# Patient Record
Sex: MALE | URBAN - METROPOLITAN AREA
[De-identification: names, ages, dates, MRNs, and addresses within clinical notes are randomized per-mention and may not be internally consistent; named-entity substitution may affect disease eponyms.]

---

## 2018-07-06 ENCOUNTER — OFFICE VISIT (OUTPATIENT)
Dept: URBAN - METROPOLITAN AREA CLINIC 76 | Facility: CLINIC | Age: 83
End: 2018-07-06
Payer: COMMERCIAL

## 2018-07-06 DIAGNOSIS — H35.3122 NEXDTVE AGE-RELATED MCLR DEGN, LEFT EYE, INTERMED DRY STAGE: Primary | ICD-10-CM

## 2018-07-06 PROCEDURE — 92134 CPTRZ OPH DX IMG PST SGM RTA: CPT | Performed by: OPHTHALMOLOGY

## 2018-07-06 PROCEDURE — 99213 OFFICE O/P EST LOW 20 MIN: CPT | Performed by: OPHTHALMOLOGY

## 2018-07-06 ASSESSMENT — INTRAOCULAR PRESSURE
OD: 16
OS: 12

## 2018-07-06 NOTE — IMPRESSION/PLAN
Impression: Nexdtve age-related mclr degn, left eye, intermed dry stage: H35.3122. Plan: OCT ordered and performed today. Discussed diagnosis with patient, OCT demonstrates the lack of SRF or CME. Additional treatment is not recommended at this time but we will continue to monitor frequently. The patient was advised to continue AREDS multi-vitamins, monitor the amsler grid closely, monitor vision one eye at a time. Call immediately with any vision changes. Patient agrees with plan.

## 2018-07-06 NOTE — IMPRESSION/PLAN
Impression: Exdtve age-rel mclr degn, right eye, with inact chrdl neovas: H35.5705. Plan: OCT ordered and performed today. Discussed diagnosis with patient, OCT demonstrates Drusen w clumping in the right eye. Discussed with Patient there is no bleeding today, recommend patient to closely monitor vision. Patient will travel in Nov. so will see patient prior. Discussed the bleeding has resolved. Additional treatment is not recommended at this time but we will continue to monitor frequently. The patient was advised to continue AREDS multi-vitamins, monitor the amsler grid closely, monitor vision one eye at a time. Call immediately with any vision changes. Patient agrees with plan.

## 2018-09-28 ENCOUNTER — OFFICE VISIT (OUTPATIENT)
Dept: URBAN - METROPOLITAN AREA CLINIC 76 | Facility: CLINIC | Age: 83
End: 2018-09-28
Payer: COMMERCIAL

## 2018-09-28 DIAGNOSIS — H35.3212 EXDTVE AGE-REL MCLR DEGN, RIGHT EYE, WITH INACT CHRDL NEOVAS: Primary | ICD-10-CM

## 2018-09-28 PROCEDURE — 99213 OFFICE O/P EST LOW 20 MIN: CPT | Performed by: OPHTHALMOLOGY

## 2018-09-28 PROCEDURE — 92134 CPTRZ OPH DX IMG PST SGM RTA: CPT | Performed by: OPHTHALMOLOGY

## 2018-09-28 ASSESSMENT — INTRAOCULAR PRESSURE
OD: 10
OS: 11

## 2018-09-28 NOTE — IMPRESSION/PLAN
Impression: Nexdtve age-related mclr degn, left eye, intermed dry stage: H35.3122. OS. Plan: OCT ordered and performed today. Discussed diagnosis with patient, OCT demonstrates the lack of SRF or CME. Additional treatment is not recommended at this time but we will continue to monitor frequently. The patient was advised to continue AREDS multi-vitamins, monitor the amsler grid closely, monitor vision one eye at a time. Call immediately with any vision changes. Patient agrees with plan.

## 2018-09-28 NOTE — IMPRESSION/PLAN
Impression: Exdtve age-rel mclr degn, right eye, with inact chrdl neovas: H35.3212. OD. Plan: OCT ordered and performed today. Discussed diagnosis with patient, OCT demonstrates Drusen w clumping in the right eye. Discussed with Patient there is no bleeding today, recommend patient to closely monitor vision. Additional treatment is not recommended at this time but we will continue to monitor frequently. The patient was advised to continue AREDS multi-vitamins, monitor the amsler grid closely, monitor vision one eye at a time. Call immediately with any vision changes. Patient agrees with plan.

## 2018-09-28 NOTE — IMPRESSION/PLAN
Impression: Diagnosis: Primary open-angle glaucoma, bilateral, moderate stage. Code: N60.7935. OU. Plan: Discussed diagnosis in detail with patient. Advised patient of condition. Will continue to observe condition and or symptoms. continue Combigan BID OU, Lanoprost QHS OU

## 2019-02-25 ENCOUNTER — APPOINTMENT (RX ONLY)
Dept: URBAN - METROPOLITAN AREA CLINIC 167 | Facility: CLINIC | Age: 84
Setting detail: DERMATOLOGY
End: 2019-02-25

## 2019-02-25 DIAGNOSIS — L57.0 ACTINIC KERATOSIS: ICD-10-CM

## 2019-02-25 DIAGNOSIS — D22 MELANOCYTIC NEVI: ICD-10-CM

## 2019-02-25 PROBLEM — Z92.3 PERSONAL HISTORY OF IRRADIATION: Status: ACTIVE | Noted: 2019-02-25

## 2019-02-25 PROBLEM — D22.39 MELANOCYTIC NEVI OF OTHER PARTS OF FACE: Status: ACTIVE | Noted: 2019-02-25

## 2019-02-25 PROBLEM — Z85.46 PERSONAL HISTORY OF MALIGNANT NEOPLASM OF PROSTATE: Status: ACTIVE | Noted: 2019-02-25

## 2019-02-25 PROCEDURE — 17000 DESTRUCT PREMALG LESION: CPT

## 2019-02-25 PROCEDURE — 99212 OFFICE O/P EST SF 10 MIN: CPT | Mod: 25

## 2019-02-25 PROCEDURE — ? LIQUID NITROGEN

## 2019-02-25 PROCEDURE — 17003 DESTRUCT PREMALG LES 2-14: CPT

## 2019-02-25 ASSESSMENT — LOCATION SIMPLE DESCRIPTION DERM
LOCATION SIMPLE: LEFT CHEEK
LOCATION SIMPLE: RIGHT TEMPLE
LOCATION SIMPLE: LEFT FOREHEAD
LOCATION SIMPLE: RIGHT EYEBROW
LOCATION SIMPLE: RIGHT FOREHEAD

## 2019-02-25 ASSESSMENT — LOCATION DETAILED DESCRIPTION DERM
LOCATION DETAILED: RIGHT MID TEMPLE
LOCATION DETAILED: RIGHT INFERIOR MEDIAL FOREHEAD
LOCATION DETAILED: LEFT SUPERIOR FOREHEAD
LOCATION DETAILED: RIGHT CENTRAL EYEBROW
LOCATION DETAILED: LEFT CENTRAL MALAR CHEEK

## 2019-02-25 ASSESSMENT — LOCATION ZONE DERM: LOCATION ZONE: FACE

## 2019-02-28 ENCOUNTER — OFFICE VISIT (OUTPATIENT)
Dept: URBAN - METROPOLITAN AREA CLINIC 81 | Facility: CLINIC | Age: 84
End: 2019-02-28
Payer: COMMERCIAL

## 2019-02-28 DIAGNOSIS — H52.4 PRESBYOPIA: ICD-10-CM

## 2019-02-28 PROCEDURE — 92014 COMPRE OPH EXAM EST PT 1/>: CPT | Performed by: OPHTHALMOLOGY

## 2019-02-28 ASSESSMENT — INTRAOCULAR PRESSURE
OD: 10
OS: 10

## 2019-02-28 NOTE — IMPRESSION/PLAN
Impression: Diagnosis: Primary open-angle glaucoma, bilateral, moderate stage. Code: H94.9180. OU.  IOP OU controlled on current regimen. No changes needed. Plan: Continue to monitor. Continue Brimonidine BID OU, Timolol BID OU. 
ok to continue off latanoprost for now. Needs updated tests.

## 2019-02-28 NOTE — IMPRESSION/PLAN
Impression: Bilateral nonexudative age-related macular degeneration, intermediate dry stage: H35.3132. OU. Plan: Will continue to observe condition and or symptoms.  Use of vitamins may reduce progression of ARMD. being followed by Dr Angela Hendricks

## 2019-06-06 ENCOUNTER — OFFICE VISIT (OUTPATIENT)
Dept: URBAN - METROPOLITAN AREA CLINIC 81 | Facility: CLINIC | Age: 84
End: 2019-06-06
Payer: COMMERCIAL

## 2019-06-06 PROCEDURE — 92133 CPTRZD OPH DX IMG PST SGM ON: CPT | Performed by: OPHTHALMOLOGY

## 2019-06-06 PROCEDURE — 92014 COMPRE OPH EXAM EST PT 1/>: CPT | Performed by: OPHTHALMOLOGY

## 2019-06-06 PROCEDURE — 92083 EXTENDED VISUAL FIELD XM: CPT | Performed by: OPHTHALMOLOGY

## 2019-06-06 RX ORDER — PREDNISOLONE ACETATE 10 MG/ML
1 % SUSPENSION/ DROPS OPHTHALMIC
Qty: 1 | Refills: 0 | Status: INACTIVE
Start: 2019-06-06 | End: 2019-07-18

## 2019-06-06 ASSESSMENT — INTRAOCULAR PRESSURE
OD: 12
OS: 12

## 2019-06-06 NOTE — IMPRESSION/PLAN
Impression: Bilateral nonexudative age-related macular degeneration, intermediate dry stage: H35.3132. OU. Plan: Will continue to observe condition and or symptoms.  Use of vitamins may reduce progression of ARMD.

## 2019-06-06 NOTE — IMPRESSION/PLAN
Impression: Diagnosis: Primary open-angle glaucoma, bilateral, moderate stage. Code: K15.5522. OU.  IOP OU controlled on current regimen. No changes needed. OCT stable compared to last.  VF stable compared to last. Plan: Continue to monitor. Continue Brimonidine BID OU, Timolol BID OU. 
ok to continue off latanoprost for now.

## 2019-06-06 NOTE — IMPRESSION/PLAN
Impression: Other corneal scar: H17.89. OS. pannus ? secondary to staph marginal vs Glaucoma drops. Plan: Discussed diagnosis in detail with patient. Recommend starting Pred Forte BID OS. 
glaucoma warnings given.

## 2019-06-21 ENCOUNTER — OFFICE VISIT (OUTPATIENT)
Dept: URBAN - METROPOLITAN AREA CLINIC 81 | Facility: CLINIC | Age: 84
End: 2019-06-21
Payer: COMMERCIAL

## 2019-06-21 PROCEDURE — 99213 OFFICE O/P EST LOW 20 MIN: CPT | Performed by: OPHTHALMOLOGY

## 2019-06-21 ASSESSMENT — INTRAOCULAR PRESSURE
OD: 10
OS: 8

## 2019-06-21 NOTE — IMPRESSION/PLAN
Impression: Diagnosis: Primary open-angle glaucoma, bilateral, moderate stage. Code: C47.0906. OU.  IOP OU controlled on current regimen. No changes needed. Plan: Continue to monitor. Continue Brimonidine BID OU, Timolol BID OU. 
ok to continue off latanoprost for now.

## 2019-06-21 NOTE — IMPRESSION/PLAN
Impression: Other corneal scar: H17.89. OS. pannus ? secondary to staph marginal vs Glaucoma drops. improved today Plan: Continue to monitor. Decrease Pred Forte QD OS. 
glaucoma warnings given.

## 2019-07-18 ENCOUNTER — OFFICE VISIT (OUTPATIENT)
Dept: URBAN - METROPOLITAN AREA CLINIC 81 | Facility: CLINIC | Age: 84
End: 2019-07-18
Payer: COMMERCIAL

## 2019-07-18 PROCEDURE — 99213 OFFICE O/P EST LOW 20 MIN: CPT | Performed by: OPHTHALMOLOGY

## 2019-07-18 RX ORDER — PREDNISOLONE ACETATE 10 MG/ML
1 % SUSPENSION/ DROPS OPHTHALMIC
Qty: 1 | Refills: 1 | Status: INACTIVE
Start: 2019-07-18 | End: 2019-08-13

## 2019-07-18 ASSESSMENT — INTRAOCULAR PRESSURE
OD: 10
OS: 8

## 2019-07-18 NOTE — IMPRESSION/PLAN
Impression: Diagnosis: Primary open-angle glaucoma, bilateral, moderate stage. Code: G73.9506. OU.  IOP OU controlled on current regimen. No changes needed. Plan: Continue to monitor. Continue Brimonidine BID OU, Timolol BID OU. 
ok to continue off latanoprost for now.

## 2019-07-18 NOTE — IMPRESSION/PLAN
Impression: Other corneal scar: H17.89. OS. pannus ? secondary to staph marginal vs Glaucoma drops. Continued improvement. Plan: Continue to monitor. Decrease Pred Forte QD OS. 
glaucoma warnings given.

## 2019-08-13 ENCOUNTER — OFFICE VISIT (OUTPATIENT)
Dept: URBAN - METROPOLITAN AREA CLINIC 76 | Facility: CLINIC | Age: 84
End: 2019-08-13
Payer: COMMERCIAL

## 2019-08-13 PROCEDURE — 99214 OFFICE O/P EST MOD 30 MIN: CPT | Performed by: OPHTHALMOLOGY

## 2019-08-13 RX ORDER — PREDNISOLONE ACETATE 10 MG/ML
1 % SUSPENSION/ DROPS OPHTHALMIC
Qty: 5 | Refills: 0 | Status: INACTIVE
Start: 2019-08-13 | End: 2020-02-07

## 2019-08-13 ASSESSMENT — INTRAOCULAR PRESSURE
OD: 9
OS: 7

## 2019-08-13 NOTE — IMPRESSION/PLAN
Impression: Uveitis: H20.9. OD. Plan: Discussed diagnosis in detail with patient.  Recommend starting Pred Forte QD OU

## 2019-08-13 NOTE — IMPRESSION/PLAN
Impression: Diagnosis: Primary open-angle glaucoma, bilateral, moderate stage. Code: E45.8587. OU.  IOP OU controlled on current regimen. No changes needed. Plan: Continue to monitor. Continue Brimonidine BID OU, Timolol BID OU. 
ok to continue off latanoprost for now.

## 2019-08-13 NOTE — IMPRESSION/PLAN
Impression: Bilateral nonexudative age-related macular degeneration, intermediate dry stage: H35.3132. OU.
MAC-OCT done today. OD stable compared to last, OS ? progression Plan: Will continue to observe condition and or symptoms.  Use of vitamins may reduce progression of ARMD. Recommend consult w/ Dr Meliza Germain

## 2019-08-13 NOTE — IMPRESSION/PLAN
Impression: Other secondary cataract, bilateral: H26.493. OU. Plan: Will continue to observe condition and or symptoms.  can consider Yag once cleared by Dr Elysia Prieto

## 2019-08-16 ENCOUNTER — OFFICE VISIT (OUTPATIENT)
Dept: URBAN - METROPOLITAN AREA CLINIC 76 | Facility: CLINIC | Age: 84
End: 2019-08-16
Payer: COMMERCIAL

## 2019-08-16 PROCEDURE — 67028 INJECTION EYE DRUG: CPT | Performed by: OPHTHALMOLOGY

## 2019-08-16 PROCEDURE — 92134 CPTRZ OPH DX IMG PST SGM RTA: CPT | Performed by: OPHTHALMOLOGY

## 2019-08-16 PROCEDURE — 99213 OFFICE O/P EST LOW 20 MIN: CPT | Performed by: OPHTHALMOLOGY

## 2019-08-16 ASSESSMENT — INTRAOCULAR PRESSURE
OD: 9
OS: 8

## 2019-08-16 NOTE — IMPRESSION/PLAN
Impression: Exdtve age-rel mclr degn, right eye, with inact chrdl neovas: H35.1222. OD. Plan: OCT ordered and performed today. Discussed diagnosis with patient, OCT demonstrates the lack of SRF or CME. Additional treatment is not recommended at this time but we will continue to monitor frequently. The patient was advised to continue to monitor the amsler grid closely, monitor vision one eye at a time. Call immediately with any vision changes. Patient agrees with plan.

## 2019-08-16 NOTE — IMPRESSION/PLAN
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 OS. Plan: OCT ordered and performed today. Discussed diagnosis in detail with patient. Discussed treatment options with patient. Discussed risks and benefits and patient understands. A 3 month series of Avastin intravitreal injection's in the left eye, 1 EVERY MONTH  #1 today then #2 in 1 month, #3 in 2 month's, Then perform DE/OCT in 3 month's.

## 2019-08-20 ENCOUNTER — OFFICE VISIT (OUTPATIENT)
Dept: URBAN - METROPOLITAN AREA CLINIC 76 | Facility: CLINIC | Age: 84
End: 2019-08-20
Payer: COMMERCIAL

## 2019-08-20 PROCEDURE — 99213 OFFICE O/P EST LOW 20 MIN: CPT | Performed by: OPHTHALMOLOGY

## 2019-08-20 ASSESSMENT — INTRAOCULAR PRESSURE
OD: 9
OS: 9

## 2019-08-20 NOTE — IMPRESSION/PLAN
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 OS.  Plan: s/p Injection last friday, will be getting 2 additional

## 2019-08-20 NOTE — IMPRESSION/PLAN
Impression: Diagnosis: Primary open-angle glaucoma, bilateral, moderate stage. Code: I11.1885. OU.  IOP OU controlled on current regimen. No changes needed. Plan: Continue to monitor. Continue Brimonidine BID OU, Timolol BID OU.

## 2019-08-20 NOTE — IMPRESSION/PLAN
Impression: Other secondary cataract, bilateral: H26.493. OU. Plan: Will continue to observe condition and or symptoms.  can consider Yag once cleared by Dr Selene Hector

## 2019-08-20 NOTE — IMPRESSION/PLAN
Impression: Exdtve age-rel mclr degn, right eye, with inact chrdl neovas: H35.3212. OD. Plan: Continue to observe.

## 2019-08-20 NOTE — IMPRESSION/PLAN
Impression: Uveitis: H20.9. OD. a/c quiet today. Plan: continue to monitor.  Continue Pred Forte QD OU

## 2019-08-27 ENCOUNTER — OFFICE VISIT (OUTPATIENT)
Dept: URBAN - METROPOLITAN AREA CLINIC 76 | Facility: CLINIC | Age: 84
End: 2019-08-27
Payer: COMMERCIAL

## 2019-08-27 PROCEDURE — 99213 OFFICE O/P EST LOW 20 MIN: CPT | Performed by: OPHTHALMOLOGY

## 2019-08-27 ASSESSMENT — INTRAOCULAR PRESSURE
OD: 8
OS: 8

## 2019-08-27 NOTE — IMPRESSION/PLAN
Impression: Exdtve age-rel mclr degn, right eye, with inact chrdl neovas: H35.3092. OD.
? cause for vision changes Plan: Continue to monitor.  being followed by Dr Meliza Germain

## 2019-08-27 NOTE — IMPRESSION/PLAN
Impression: Other secondary cataract, bilateral: H26.493. OU. Plan: Will continue to observe condition and or symptoms.  can consider Yag once cleared by Dr Angela Hendricks

## 2019-08-27 NOTE — IMPRESSION/PLAN
Impression: Diagnosis: Primary open-angle glaucoma, bilateral, moderate stage. Code: R42.3282. OU. IOP OU controlled on current regimen. No changes needed. Plan: Continue to monitor. Continue Brimonidine BID OU, Timolol BID OU.

## 2019-08-27 NOTE — IMPRESSION/PLAN
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 OS.
? cause for vision changes. Plan: Continue to monitor.  being followed by Dr Allen Pun

## 2019-09-27 ENCOUNTER — PROCEDURE (OUTPATIENT)
Dept: URBAN - METROPOLITAN AREA CLINIC 76 | Facility: CLINIC | Age: 84
End: 2019-09-27
Payer: COMMERCIAL

## 2019-09-27 PROCEDURE — 67028 INJECTION EYE DRUG: CPT | Performed by: OPHTHALMOLOGY

## 2019-10-10 ENCOUNTER — OFFICE VISIT (OUTPATIENT)
Dept: URBAN - METROPOLITAN AREA CLINIC 81 | Facility: CLINIC | Age: 84
End: 2019-10-10
Payer: COMMERCIAL

## 2019-10-10 DIAGNOSIS — H26.493 OTHER SECONDARY CATARACT, BILATERAL: ICD-10-CM

## 2019-10-10 DIAGNOSIS — H17.89 OTHER CORNEAL SCAR: ICD-10-CM

## 2019-10-10 DIAGNOSIS — H20.9 UVEITIS: ICD-10-CM

## 2019-10-10 DIAGNOSIS — H35.3221 EXDTVE AGE-REL MCLR DEGN, LEFT EYE, WITH ACTV CHRDL NEOVAS: ICD-10-CM

## 2019-10-10 PROCEDURE — 99213 OFFICE O/P EST LOW 20 MIN: CPT | Performed by: OPHTHALMOLOGY

## 2019-10-10 ASSESSMENT — INTRAOCULAR PRESSURE
OS: 8
OD: 8

## 2019-10-10 NOTE — IMPRESSION/PLAN
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 OS.
? cause for vision changes. Plan: Continue to monitor.  being followed by Dr Juanito Pederson

## 2019-10-10 NOTE — IMPRESSION/PLAN
Impression: Exdtve age-rel mclr degn, right eye, with inact chrdl neovas: H35.3212. OD.
? cause for vision changes Plan: Continue to monitor.  being followed by Dr Ilda Mooney

## 2019-10-10 NOTE — IMPRESSION/PLAN
Impression: Other corneal scar: H17.89. OS. pannus ? secondary to staph marginal vs Glaucoma drops. Continued improvement. Plan: Continue to monitor. Continue  Pred Forte QD OU
glaucoma warnings given.

## 2019-10-10 NOTE — IMPRESSION/PLAN
Impression: Other secondary cataract, bilateral: H26.493. OU. Plan: Will continue to observe condition and or symptoms.  can consider Yag once cleared by Dr Mario Hager

## 2019-10-10 NOTE — IMPRESSION/PLAN
Impression: Diagnosis: Primary open-angle glaucoma, bilateral, moderate stage. Code: S99.1396. OU. IOP OU controlled on current regimen. No changes needed. Plan: Continue to monitor. Continue Brimonidine BID OU, Timolol BID OU.

## 2019-10-30 ENCOUNTER — PROCEDURE (OUTPATIENT)
Dept: URBAN - METROPOLITAN AREA CLINIC 11 | Facility: CLINIC | Age: 84
End: 2019-10-30
Payer: COMMERCIAL

## 2019-10-30 PROCEDURE — 67028 INJECTION EYE DRUG: CPT | Performed by: OPHTHALMOLOGY

## 2019-12-05 ENCOUNTER — OFFICE VISIT (OUTPATIENT)
Dept: URBAN - METROPOLITAN AREA CLINIC 76 | Facility: CLINIC | Age: 84
End: 2019-12-05
Payer: COMMERCIAL

## 2019-12-05 PROCEDURE — 92012 INTRM OPH EXAM EST PATIENT: CPT | Performed by: OPHTHALMOLOGY

## 2019-12-05 PROCEDURE — 92134 CPTRZ OPH DX IMG PST SGM RTA: CPT | Performed by: OPHTHALMOLOGY

## 2019-12-05 ASSESSMENT — INTRAOCULAR PRESSURE
OS: 17
OD: 16

## 2019-12-05 NOTE — IMPRESSION/PLAN
Impression: Exdtve age-rel mclr degn, right eye, with inact chrdl neovas: H35.4742. OD. Plan: OCT ordered and performed today. Discussed diagnosis with patient, OCT demonstrates the lack of SRF or CME. Additional treatment is not recommended at this time but we will continue to monitor frequently. The patient was advised to continue to monitor the amsler grid closely, monitor vision one eye at a time. Call immediately with any vision changes. Patient agrees with plan.

## 2019-12-05 NOTE — IMPRESSION/PLAN
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 OS.
? cause for vision changes. 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 Discussed options of close observation vs T+E, pt elects close observation.   RTC prn [facility].

1 month, OCT OU

## 2020-02-07 ENCOUNTER — OFFICE VISIT (OUTPATIENT)
Dept: URBAN - METROPOLITAN AREA CLINIC 81 | Facility: CLINIC | Age: 85
End: 2020-02-07
Payer: COMMERCIAL

## 2020-02-07 PROCEDURE — 99213 OFFICE O/P EST LOW 20 MIN: CPT | Performed by: OPHTHALMOLOGY

## 2020-02-07 RX ORDER — PREDNISOLONE ACETATE 10 MG/ML
1 % SUSPENSION/ DROPS OPHTHALMIC
Qty: 5 | Refills: 0 | Status: INACTIVE
Start: 2020-02-07 | End: 2020-10-23

## 2020-02-07 ASSESSMENT — INTRAOCULAR PRESSURE
OS: 10
OD: 9

## 2020-02-07 NOTE — IMPRESSION/PLAN
Impression: Exdtve age-rel mclr degn, right eye, with inact chrdl neovas: H35.3212. OD.  Plan: Continue to monitor

## 2020-02-07 NOTE — IMPRESSION/PLAN
Impression: Diagnosis: Primary open-angle glaucoma, bilateral, moderate stage. Code: B70.5112. OU. IOP OU controlled on current regimen. No changes needed. Plan: Continue to monitor. Continue Brimonidine BID OU, Timolol BID OU.

## 2020-02-07 NOTE — IMPRESSION/PLAN
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 OS.
? cause for vision changes. Plan: Continue to monitor. being followed by Dr Petar Ramírez.  
patient had to miss last appt due to being in the hospital

## 2020-02-28 ENCOUNTER — OFFICE VISIT (OUTPATIENT)
Dept: URBAN - METROPOLITAN AREA CLINIC 76 | Facility: CLINIC | Age: 85
End: 2020-02-28
Payer: COMMERCIAL

## 2020-02-28 PROCEDURE — 99213 OFFICE O/P EST LOW 20 MIN: CPT | Performed by: OPHTHALMOLOGY

## 2020-02-28 PROCEDURE — 92134 CPTRZ OPH DX IMG PST SGM RTA: CPT | Performed by: OPHTHALMOLOGY

## 2020-02-28 ASSESSMENT — INTRAOCULAR PRESSURE
OD: 10
OS: 10

## 2020-02-28 NOTE — IMPRESSION/PLAN
Impression: Exudative age-rel mclr degn, bi, with inact chrdl neovas: H35.3232 OU. S/p Avastin OS 10/30/2019 Plan: OCT ordered and performed today. Discussed diagnosis with patient, OCT demonstrates the lack of SRF or CME. Additional treatment is not recommended at this time but we will continue to monitor frequently. The patient was advised to continue to closely monitor vision one eye at a time. Call immediately with any vision changes. Patient agrees with plan.

## 2020-06-19 ENCOUNTER — OFFICE VISIT (OUTPATIENT)
Dept: URBAN - METROPOLITAN AREA CLINIC 76 | Facility: CLINIC | Age: 85
End: 2020-06-19
Payer: COMMERCIAL

## 2020-06-19 PROCEDURE — 92134 CPTRZ OPH DX IMG PST SGM RTA: CPT | Performed by: OPHTHALMOLOGY

## 2020-06-19 PROCEDURE — 67028 INJECTION EYE DRUG: CPT | Performed by: OPHTHALMOLOGY

## 2020-06-19 PROCEDURE — 99213 OFFICE O/P EST LOW 20 MIN: CPT | Performed by: OPHTHALMOLOGY

## 2020-06-19 ASSESSMENT — INTRAOCULAR PRESSURE
OD: 10
OS: 10

## 2020-06-19 NOTE — IMPRESSION/PLAN
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 OS. Plan: OCT ordered and performed today. Discussed diagnosis in detail with patient. Discussed treatment options with patient. Discussed risks and benefits and patient understands. Recommend a one time Avastin intravitreal injection in the left eye today. Discussed with patient to return in 6 weeks for DE/OCT. Patient understands and elects to proceed with Avastin OS today.

## 2020-06-19 NOTE — IMPRESSION/PLAN
Impression: Exdtve age-rel mclr degn, right eye, with inact chrdl neovas: H35.3212. OD. Plan: OCT ordered and performed today. Discussed diagnosis with patient, OCT demonstrates the lack of SRF or CME. Additional treatment is not recommended at this time but we will continue to monitor frequently.

## 2020-06-26 ENCOUNTER — OFFICE VISIT (OUTPATIENT)
Dept: URBAN - METROPOLITAN AREA CLINIC 81 | Facility: CLINIC | Age: 85
End: 2020-06-26
Payer: COMMERCIAL

## 2020-06-26 PROCEDURE — 99213 OFFICE O/P EST LOW 20 MIN: CPT | Performed by: OPHTHALMOLOGY

## 2020-06-26 ASSESSMENT — INTRAOCULAR PRESSURE
OD: 9
OS: 8

## 2020-06-26 NOTE — IMPRESSION/PLAN
Impression: Diagnosis: Primary open-angle glaucoma, bilateral, moderate stage. Code: C28.7379. OU. IOP OU controlled on current regimen. No changes needed. Plan: Continue to monitor. Continue Brimonidine BID OU, Timolol BID OU.

## 2020-06-26 NOTE — IMPRESSION/PLAN
Impression: Exdtve age-rel mclr degn, right eye, with inact chrdl neovas: H35.3212. OD. Plan: Continue to monitor.  being followed by Dr Hugh Burkett

## 2020-06-26 NOTE — IMPRESSION/PLAN
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 OS. Plan: Continue to monitor.  being followed by Dr Josse Robles

## 2020-07-31 ENCOUNTER — OFFICE VISIT (OUTPATIENT)
Dept: URBAN - METROPOLITAN AREA CLINIC 76 | Facility: CLINIC | Age: 85
End: 2020-07-31
Payer: COMMERCIAL

## 2020-07-31 PROCEDURE — 99213 OFFICE O/P EST LOW 20 MIN: CPT | Performed by: OPHTHALMOLOGY

## 2020-07-31 PROCEDURE — 92134 CPTRZ OPH DX IMG PST SGM RTA: CPT | Performed by: OPHTHALMOLOGY

## 2020-07-31 ASSESSMENT — INTRAOCULAR PRESSURE
OD: 6
OS: 7

## 2020-07-31 NOTE — IMPRESSION/PLAN
Impression: Exudative age-rel mclr degn, bi, with inact chrdl neovas: H35.3232. OU. Plan: OCT ordered and performed today. Discussed diagnosis with patient, OCT demonstrates the lack of SRF or CME. Additional treatment is not recommended at this time but we will continue to monitor frequently. The patient was advised to continue AREDS multi-vitamins, monitor the amsler grid closely, monitor vision one eye at a time. Call immediately with any vision changes. Patient agrees with plan.

## 2020-08-28 ENCOUNTER — OFFICE VISIT (OUTPATIENT)
Dept: URBAN - METROPOLITAN AREA CLINIC 76 | Facility: CLINIC | Age: 85
End: 2020-08-28
Payer: COMMERCIAL

## 2020-08-28 PROCEDURE — 99213 OFFICE O/P EST LOW 20 MIN: CPT | Performed by: OPHTHALMOLOGY

## 2020-08-28 PROCEDURE — 92134 CPTRZ OPH DX IMG PST SGM RTA: CPT | Performed by: OPHTHALMOLOGY

## 2020-08-28 ASSESSMENT — INTRAOCULAR PRESSURE
OD: 5
OS: 6

## 2020-08-28 NOTE — IMPRESSION/PLAN
Impression: Other secondary cataract, bilateral: H26.493. Bilateral. Plan: Recommend Yag consult with Dr. Олег Lopez at his next visit.

## 2020-08-28 NOTE — IMPRESSION/PLAN
Impression: Exudative age-rel mclr degn, bi, with inact chrdl neovas: H35.3232. Bilateral. Plan: OCT ordered and performed today. Discussed diagnosis with patient, OCT demonstrates the lack of SRF or CME. Additional treatment is not recommended at this time but we will continue to monitor frequently. The patient was advised to continue AREDS multi-vitamins, monitor the amsler grid closely, monitor vision one eye at a time. Call immediately with any vision changes. Patient agrees with plan.

## 2020-10-23 ENCOUNTER — OFFICE VISIT (OUTPATIENT)
Dept: URBAN - METROPOLITAN AREA CLINIC 76 | Facility: CLINIC | Age: 85
End: 2020-10-23
Payer: COMMERCIAL

## 2020-10-23 DIAGNOSIS — H35.3132 NONEXUDATIVE AGE-RELATED MACULAR DEGENERATION, BILATERAL, INTERMEDIATE DRY STAGE: Primary | ICD-10-CM

## 2020-10-23 PROCEDURE — 99213 OFFICE O/P EST LOW 20 MIN: CPT | Performed by: OPHTHALMOLOGY

## 2020-10-23 PROCEDURE — 92134 CPTRZ OPH DX IMG PST SGM RTA: CPT | Performed by: OPHTHALMOLOGY

## 2020-10-23 ASSESSMENT — INTRAOCULAR PRESSURE
OS: 10
OD: 10

## 2021-01-15 ENCOUNTER — OFFICE VISIT (OUTPATIENT)
Dept: URBAN - METROPOLITAN AREA CLINIC 76 | Facility: CLINIC | Age: 86
End: 2021-01-15
Payer: COMMERCIAL

## 2021-01-15 DIAGNOSIS — H35.3232 EXUDATIVE AGE-RELATED MACULAR DEGENERATION, BILATERAL, WITH INACTIVE CHOROIDAL NEOVASCULARIZATION: Primary | ICD-10-CM

## 2021-01-15 PROCEDURE — 92134 CPTRZ OPH DX IMG PST SGM RTA: CPT | Performed by: OPHTHALMOLOGY

## 2021-01-15 PROCEDURE — 99213 OFFICE O/P EST LOW 20 MIN: CPT | Performed by: OPHTHALMOLOGY

## 2021-01-15 ASSESSMENT — INTRAOCULAR PRESSURE
OS: 12
OD: 12

## 2021-01-15 NOTE — IMPRESSION/PLAN
Impression: Other secondary cataract, bilateral: H26.493. Bilateral. Plan: Discussed diagnosis in detail with patient. Discussed treatment options with patient. Surgical risks and benefits were discussed, explained and understood by patient. Patient would like to have a consult with Dr. Yomaira Baeza or Dr. Kavon Alejandre.

## 2021-01-15 NOTE — IMPRESSION/PLAN
Impression: Exudative age-related macular degeneration, bilateral, with inactive choroidal neovascularization: H35.3232. Bilateral. Plan: OCT ordered and performed today. Discussed diagnosis with patient, OCT demonstrates the lack of SRF or CME. Additional treatment is not recommended at this time but we will continue to monitor frequently. The patient was advised to continue AREDS multi-vitamins, monitor the amsler grid closely, monitor vision one eye at a time. Call immediately with any vision changes. Patient agrees with plan.

## 2021-05-13 ENCOUNTER — OFFICE VISIT (OUTPATIENT)
Dept: URBAN - METROPOLITAN AREA CLINIC 76 | Facility: CLINIC | Age: 86
End: 2021-05-13
Payer: COMMERCIAL

## 2021-05-13 DIAGNOSIS — Z96.1 PRESENCE OF INTRAOCULAR LENS: ICD-10-CM

## 2021-05-13 PROCEDURE — 92014 COMPRE OPH EXAM EST PT 1/>: CPT | Performed by: OPHTHALMOLOGY

## 2021-05-13 PROCEDURE — 92083 EXTENDED VISUAL FIELD XM: CPT | Performed by: OPHTHALMOLOGY

## 2021-05-13 PROCEDURE — 92133 CPTRZD OPH DX IMG PST SGM ON: CPT | Performed by: OPHTHALMOLOGY

## 2021-05-13 ASSESSMENT — INTRAOCULAR PRESSURE
OD: 14
OS: 18

## 2021-05-13 NOTE — IMPRESSION/PLAN
Impression: Diagnosis: Primary open-angle glaucoma, bilateral, moderate stage. Code: B06.2726. IOP OU controlled on current regimen. No changes needed. VF/OCT reviewed, worse OS compared to previous. VF consistent with AMD. Plan: Discussed condition. Continue Brimonidine BID OU, Timolol BID OU. Advised to use artificial tears, Zaditor and cool compresses PRN for comfort. Repeat VF/OCT 5/2022.

## 2021-05-13 NOTE — IMPRESSION/PLAN
Impression: Other secondary cataract, bilateral: H26.493. OS visually significant. Plan: Discussed condition. Hold off on YAG for now until retina status is more stable.

## 2021-05-13 NOTE — IMPRESSION/PLAN
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   new heme noted today. Pt noticed decrease vision OS x 2 mos. Plan: Discussed condition. Recommend pt follow up with Dr. Elysia Prieto next available, don't wait for appt on 6/18/21.

## 2021-05-13 NOTE — IMPRESSION/PLAN
Impression: Diagnosis: Presence of intraocular lens. Code: Z96.1. Bilateral. Plan: Continue to monitor.

## 2021-05-13 NOTE — IMPRESSION/PLAN
Impression: Exdtve age-rel mclr degn, right eye, with inact chrdl neovas: H35.3212. OD. Plan: Continue to monitor. Being followed by Dr Hilario Husain.

## 2021-05-17 ENCOUNTER — OFFICE VISIT (OUTPATIENT)
Dept: URBAN - METROPOLITAN AREA CLINIC 76 | Facility: CLINIC | Age: 86
End: 2021-05-17
Payer: COMMERCIAL

## 2021-05-17 PROCEDURE — 92134 CPTRZ OPH DX IMG PST SGM RTA: CPT | Performed by: OPHTHALMOLOGY

## 2021-05-17 PROCEDURE — 99213 OFFICE O/P EST LOW 20 MIN: CPT | Performed by: OPHTHALMOLOGY

## 2021-05-17 PROCEDURE — 67028 INJECTION EYE DRUG: CPT | Performed by: OPHTHALMOLOGY

## 2021-05-17 ASSESSMENT — INTRAOCULAR PRESSURE
OD: 16
OS: 17

## 2021-05-17 NOTE — IMPRESSION/PLAN
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 Plan: OCT ordered and performed today. Discussed diagnosis in detail with patient. Discussed treatment options with patient. Recommend a one time Avastin injection OS. Discussed risks and benefits and patient understands. Pt elects to proceed.

## 2021-05-19 ENCOUNTER — OFFICE VISIT (OUTPATIENT)
Dept: URBAN - METROPOLITAN AREA CLINIC 76 | Facility: CLINIC | Age: 86
End: 2021-05-19
Payer: COMMERCIAL

## 2021-05-19 PROCEDURE — 99204 OFFICE O/P NEW MOD 45 MIN: CPT | Performed by: OPTOMETRIST

## 2021-05-19 ASSESSMENT — INTRAOCULAR PRESSURE
OD: 16
OS: 16

## 2021-05-19 ASSESSMENT — VISUAL ACUITY: OD: 20/100

## 2021-05-19 NOTE — IMPRESSION/PLAN
Impression: Exdtve age-rel mclr degn, right eye, with inact chrdl neovas: H35.6422. OD. Plan: Under care of Dr. Los Bonilla.

## 2021-05-19 NOTE — IMPRESSION/PLAN
"2/17/2020       Titi Bullock MD  [address]  [place] [address]        Robert Daniels  1952    Chief Complaint   Patient presents with   • Follow-up     2 Week Post-Op Follow UP FOr ABDOMINAL AORTIC ANEURYSM REPAIR WITH ENDOGRAFT. Patient denies any stroke like symptoms.    • other     Patient states been in pain ever since the procedure but now it feels like a burning sensation as well as the pain. Patient states hasnt been doing a lot of exercising either. Patient also states very sore on both sides of her stomach        Dear Titi Bullock MD:    HPI     I had the pleasure of seeing you patient in the office today for follow up.  As you recall, the patient is a 68 y.o. female who we recently saw for an abdominal aortic aneurysm.  She has a history of back problems and was having pain down both legs.  She had an incidental finding of a saccular 1.7 cm aneurysm found on a noncontrast CT.  She does smoke about 3/4 pack of cigarettes per day.  She denies any family history of aneurysms.    She underwent endovascular abdominal aortic aneurysm repair 1/22/2020.  Her groins have healed.  She is complaining of generalized abdominal pain mostly laterally with a lot of gas.      Review of Systems   Constitutional: Negative.    HENT: Negative.    Eyes: Negative.    Respiratory: Negative.    Cardiovascular: Negative.    Gastrointestinal: Negative.    Endocrine: Negative.    Genitourinary: Negative.    Musculoskeletal: Negative.    Skin: Negative.    Allergic/Immunologic: Negative.    Neurological: Negative.  Negative for dizziness.   Hematological: Negative.    Psychiatric/Behavioral: Negative.        /66 (BP Location: Right arm, Patient Position: Sitting, Cuff Size: Adult)   Pulse 78   Ht 172.7 cm (68\")   Wt 70.3 kg (155 lb)   SpO2 92%   BMI 23.57 kg/m²   Physical Exam   Constitutional: She is oriented to person, place, and time. She appears well-developed and well-nourished. No distress. " Impression: Other secondary cataract, bilateral: H26.493. OS visually significant. Dr. Austyn Villeda on 5/13/21 advised against YAG OS until retina stable. Dr. Nicole Lan 5/17/21 did not say if YAG OS was advised or not. Pt scheduled for YAG consult with me today (5/19/21). Plan: Discussed condition. I informed pt that it is Not likely that YAG OS will make significant improvement in vision OS (AMD likely limiting factor). However, YAG cap OS may make it easier for Dr. Nicole Lan to evaluate/monitor OS. Will task Dr. Los Bonilla for permission to proceed with YAG cap OS.   HENT:   Head: Normocephalic and atraumatic.   Mouth/Throat: Oropharynx is clear and moist.   Eyes: Pupils are equal, round, and reactive to light. No scleral icterus.   Neck: Normal range of motion. Neck supple. No JVD present. Carotid bruit is not present. No thyromegaly present.   Cardiovascular: Normal rate, regular rhythm, S2 normal, normal heart sounds, intact distal pulses and normal pulses. Exam reveals no gallop and no friction rub.   No murmur heard.  Bilateral groins healed   Pulmonary/Chest: Effort normal and breath sounds normal.   Abdominal: Soft. Normal appearance and bowel sounds are normal. She exhibits distension (Slightly distended.). She exhibits no pulsatile midline mass. There is no hepatosplenomegaly. There is no tenderness.   Musculoskeletal: Normal range of motion.   Neurological: She is alert and oriented to person, place, and time. No cranial nerve deficit.   Skin: Skin is warm and dry. She is not diaphoretic.   Psychiatric: She has a normal mood and affect. Her behavior is normal. Judgment and thought content normal.   Nursing note and vitals reviewed.    DIAGNOSTIC DATA:    Fl C Arm During Surgery    Result Date: 1/24/2020  Narrative: Performed by Dr. Ruiz. Please see procedure note. This report was finalized on 01/24/2020 15:20 by Dr. Daniel Ruiz MD.    Ir Prox Dis Prost Endov Rep Initial Ves    Result Date: 1/24/2020  Narrative: Performed by Dr. Riuz. Please see procedure note. This report was finalized on 01/24/2020 15:20 by Dr. Daniel Ruiz MD.      Patient Active Problem List   Diagnosis   • H/O vulvar dysplasia   • History of cervical cancer   • Tobacco abuse   • Hypertension   • Diabetes mellitus (CMS/HCC)   • Aneurysm of infrarenal abdominal aorta (CMS/HCC)   • COPD (chronic obstructive pulmonary disease) (CMS/HCC)   • Abdominal aortic aneurysm (AAA) without rupture (CMS/HCC)   • Preoperative respiratory examination   • Personal history of nicotine dependence   •  Preop testing   • Generalized anxiety disorder   • AAA (abdominal aortic aneurysm) without rupture (CMS/HCC)   • AAA (abdominal aortic aneurysm) (CMS/HCC)         ICD-10-CM ICD-9-CM   1. Abdominal aortic aneurysm (AAA) without rupture (CMS/HCC) I71.4 441.4       PLAN: After thoroughly evaluating Robert Daniels, I believe the best course of action is to remain conservative from vascular surgery standpoint.  Overall it seems she is doing well status post endovascular abdominal aortic aneurysm repair.  Her groins have healed.  She is having some complaints of discomfort to her abdomen.  She denies any fever she reports a lot of gas.  I assume she is having some degree of constipation as she states her bowels are not as they are normally.  I recommended she begin taking Colace to see if this would help.  We will see her back in 2 weeks with a CTA of the abdomen pelvis for graft surveillance.  We also discussed vascular risk factors as they pertain to progression of vascular disease including controlling her hypertension, diabetes mellitus, and smoking cessation.  I did  extensively on smoking cessation, and the patient was advised of the continued risks of smoking.  I provided over 10 minutes counseling on this matter. Body mass index is 23.57 kg/m². The patient is to continue taking their medications as previously discussed.   This was all discussed in full with complete understanding.  Thank you for allowing me to participate in the care of your patient.  Please do not hesitate to call with any questions or concerns.  We will keep you aware of any further encounters with Robert Daniels.      Sincerely Yours,      JASON Palumbo

## 2021-05-19 NOTE — IMPRESSION/PLAN
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 Received injection OS 5/17/21. Plan: Under care of Dr. Los Bonilla.

## 2021-05-19 NOTE — IMPRESSION/PLAN
Impression: Subconjunctival hemorrhage of left eye: H11.32. Pt woke up with pain. Likely secondary to injection 5/17/21. Pt wanted Dr. Concha Reddy to be informed about this pain. Plan: Discussed. Continue to monitor. Use AT's for comfort. Noted in task to Dr. Concha Reddy.

## 2021-05-19 NOTE — IMPRESSION/PLAN
Impression: Diagnosis: Primary open-angle glaucoma, bilateral, moderate stage. Code: T99.7926. IOP good OU today. Plan: Under care of Dr. Gena Goodpasture.

## 2021-05-19 NOTE — IMPRESSION/PLAN
Impression: Uveitis: H20.9. Bilateral based on KPs OU. AC quiet today OU Plan: Continue Pred Forte QD OU.

## 2021-05-21 ENCOUNTER — OFFICE VISIT (OUTPATIENT)
Dept: URBAN - METROPOLITAN AREA CLINIC 76 | Facility: CLINIC | Age: 86
End: 2021-05-21
Payer: COMMERCIAL

## 2021-05-21 PROCEDURE — 67028 INJECTION EYE DRUG: CPT | Performed by: OPHTHALMOLOGY

## 2021-05-21 PROCEDURE — 99213 OFFICE O/P EST LOW 20 MIN: CPT | Performed by: OPHTHALMOLOGY

## 2021-05-21 RX ORDER — PREDNISOLONE ACETATE 10 MG/ML
1 % SUSPENSION/ DROPS OPHTHALMIC
Qty: 1 | Refills: 0 | Status: INACTIVE
Start: 2021-05-21 | End: 2021-06-18

## 2021-05-21 RX ORDER — OXYCODONE HYDROCHLORIDE AND ACETAMINOPHEN 7.5; 325 MG/1; MG/1
TABLET ORAL
Qty: 8 | Refills: 0 | Status: ACTIVE
Start: 2021-05-21

## 2021-05-21 ASSESSMENT — INTRAOCULAR PRESSURE
OD: 16
OS: 16

## 2021-05-21 NOTE — IMPRESSION/PLAN
Impression: Other endophthalmitis: H44.19. Left. sterile iritis vs infectious Plan: The clinical exam is consistent with possible endophthalmitis and inflammation. Reviewed treatment options and discussed risk benefits associated with a intravitreal antibiotic injections. Recommend patient start Prednisolone gtts QID OS. Obtained culture of aqueous fluid or vitreous fluid sent off to lab today. Intravetreal injection of Vancomyacin and Ceftazidime today in the left eye. The patient was instructed to call our office immediately if increase pain, nausea or headache. Patient to follow up with Dr. Jimmy Meier on Tuesday.

## 2021-05-21 NOTE — IMPRESSION/PLAN
Impression: Other secondary cataract, left eye: H26.492. Left. Plan: Discussed with the patient, the main agenda of having the yag laser is for better view to the retina at this time. Patient understands vision improvement will be guarded.

## 2021-05-25 ENCOUNTER — OFFICE VISIT (OUTPATIENT)
Dept: URBAN - METROPOLITAN AREA CLINIC 76 | Facility: CLINIC | Age: 86
End: 2021-05-25
Payer: COMMERCIAL

## 2021-05-25 PROCEDURE — 99214 OFFICE O/P EST MOD 30 MIN: CPT | Performed by: OPHTHALMOLOGY

## 2021-05-25 ASSESSMENT — INTRAOCULAR PRESSURE
OS: 20
OD: 14

## 2021-05-25 NOTE — IMPRESSION/PLAN
Impression: Diagnosis: Primary open-angle glaucoma, bilateral, moderate stage. Code: E69.7223. IOP good OD, higher OS today but ok for now. Plan: Continue Brimonidine BID OU, Timolol BID OU. Repeat VF/OCT 5/2022.

## 2021-05-25 NOTE — IMPRESSION/PLAN
Impression: Other endophthalmitis: H44.19. Left. sterile iritis vs. infectious. secondary to intravitreal injections. labs reviewed, no organisms. Plan: Discussed condition in detail. Continue to monitor. Continue Pred QID OS. Follow up w/ Dr. Christie Briseno prior to next scheduled appt  (June 18).

## 2021-05-25 NOTE — IMPRESSION/PLAN
Impression: Other secondary cataract, left eye: H26.492. Left. Plan: Hold off on YAG until endophthalmitis revolves.

## 2021-05-25 NOTE — IMPRESSION/PLAN
Impression: Uveitis: H20.9. Bilateral based on KPs OU. AC quiet today OU. Plan: Continue Pred Forte QD OU.

## 2021-06-18 ENCOUNTER — OFFICE VISIT (OUTPATIENT)
Dept: URBAN - METROPOLITAN AREA CLINIC 76 | Facility: CLINIC | Age: 86
End: 2021-06-18
Payer: COMMERCIAL

## 2021-06-18 DIAGNOSIS — H26.492 OTHER SECONDARY CATARACT, LEFT EYE: ICD-10-CM

## 2021-06-18 PROCEDURE — 67028 INJECTION EYE DRUG: CPT | Performed by: OPHTHALMOLOGY

## 2021-06-18 PROCEDURE — 99213 OFFICE O/P EST LOW 20 MIN: CPT | Performed by: OPHTHALMOLOGY

## 2021-06-18 RX ORDER — PREDNISOLONE ACETATE 10 MG/ML
1 % SUSPENSION/ DROPS OPHTHALMIC
Qty: 1 | Refills: 1 | Status: INACTIVE
Start: 2021-06-18 | End: 2021-10-22

## 2021-06-18 ASSESSMENT — INTRAOCULAR PRESSURE
OS: 13
OD: 12

## 2021-06-18 NOTE — IMPRESSION/PLAN
Impression: Other endophthalmitis: H44.19 Left. Plan: Patient advised to continue with the PF gtts, BID OS will send new Rx to pharmacy.

## 2021-06-18 NOTE — IMPRESSION/PLAN
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 Left. Plan: OCT ordered and performed today. Discussed diagnosis in detail with patient. Discussed treatment options with patient. Discussed risks and benefits and patient understands. Recommend a one time intravitreal injection in the left eye today. Recommend a one time Avastin in the left eye. Patient understands and agrees with the plan.

## 2021-06-18 NOTE — IMPRESSION/PLAN
Impression: Other secondary cataract, left eye: H26.492. Plan: No retinal contraindication to yag laser

## 2021-07-01 ENCOUNTER — OFFICE VISIT (OUTPATIENT)
Dept: URBAN - METROPOLITAN AREA CLINIC 76 | Facility: CLINIC | Age: 86
End: 2021-07-01
Payer: COMMERCIAL

## 2021-07-01 DIAGNOSIS — H11.32 SUBCONJUNCTIVAL HEMORRHAGE OF LEFT EYE: Primary | ICD-10-CM

## 2021-07-01 PROCEDURE — 99213 OFFICE O/P EST LOW 20 MIN: CPT | Performed by: OPTOMETRIST

## 2021-07-01 ASSESSMENT — INTRAOCULAR PRESSURE
OS: 12
OD: 11

## 2021-07-01 NOTE — IMPRESSION/PLAN
Impression: Subconjunctival hemorrhage of left eye: H11.32. Likely secondary to injection 6/18/21. Plan: Discussed. Continue to monitor. Use AT's for comfort. If recurrence w/o getting injections, recommend blood work-up with PCP, platelet disorder?

## 2021-07-16 ENCOUNTER — OFFICE VISIT (OUTPATIENT)
Dept: URBAN - METROPOLITAN AREA CLINIC 76 | Facility: CLINIC | Age: 86
End: 2021-07-16
Payer: COMMERCIAL

## 2021-07-16 PROCEDURE — 67028 INJECTION EYE DRUG: CPT | Performed by: OPHTHALMOLOGY

## 2021-07-16 PROCEDURE — 92134 CPTRZ OPH DX IMG PST SGM RTA: CPT | Performed by: OPHTHALMOLOGY

## 2021-07-16 PROCEDURE — 99213 OFFICE O/P EST LOW 20 MIN: CPT | Performed by: OPHTHALMOLOGY

## 2021-07-16 ASSESSMENT — INTRAOCULAR PRESSURE
OD: 11
OS: 11

## 2021-07-16 NOTE — IMPRESSION/PLAN
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 Left. Plan: OCT ordered and performed today. Discussed diagnosis in detail with patient. Discussed treatment options with patient. Discussed risks and benefits and patient understands. Recommend a series of Avastin injections in the left eye starting today. Patient understands and agrees with the plan.

## 2021-08-12 ENCOUNTER — OFFICE VISIT (OUTPATIENT)
Dept: URBAN - METROPOLITAN AREA CLINIC 76 | Facility: CLINIC | Age: 86
End: 2021-08-12
Payer: COMMERCIAL

## 2021-08-12 DIAGNOSIS — H40.1132 PRIMARY OPEN-ANGLE GLAUCOMA, BILATERAL, MODERATE STAGE: Primary | ICD-10-CM

## 2021-08-12 DIAGNOSIS — H44.19 OTHER ENDOPHTHALMITIS: ICD-10-CM

## 2021-08-12 PROCEDURE — 92014 COMPRE OPH EXAM EST PT 1/>: CPT | Performed by: OPHTHALMOLOGY

## 2021-08-12 ASSESSMENT — VISUAL ACUITY: OD: 20/70

## 2021-08-12 ASSESSMENT — INTRAOCULAR PRESSURE
OD: 11
OS: 12

## 2021-08-12 ASSESSMENT — KERATOMETRY
OD: 44.00
OS: 43.88

## 2021-08-12 NOTE — IMPRESSION/PLAN
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 Left. Plan: Continue to monitor. being followed by Dr Joselyn Quezada. keep scheduled appt. discussed Carroll Dural syndrome.

## 2021-08-12 NOTE — IMPRESSION/PLAN
Impression: Other secondary cataract, left eye: H26.492. Visually significant Plan: r/b/a of YAG Cap discussed. Pt would like to proceed. Schedule YAG Cap OS. 
patient will confirm with Dr Marjorie Mancilla tomorrow that there continue to be  no contraindication with proceeding with Yag laser.

## 2021-08-12 NOTE — IMPRESSION/PLAN
Impression: Diagnosis: Primary open-angle glaucoma, bilateral, moderate stage. Code: I95.0101. IOP OU controlled on current regimen. No changes needed. Plan: Continue Brimonidine BID OU, Timolol BID OU. Repeat VF/OCT 5/2022.

## 2021-08-12 NOTE — IMPRESSION/PLAN
Impression: Exdtve age-rel mclr degn, right eye, with inact chrdl neovas: H35.3212. Right. Plan: Continue to monitor.

## 2021-08-13 ENCOUNTER — PROCEDURE (OUTPATIENT)
Dept: URBAN - METROPOLITAN AREA CLINIC 76 | Facility: CLINIC | Age: 86
End: 2021-08-13
Payer: COMMERCIAL

## 2021-08-13 PROCEDURE — 67028 INJECTION EYE DRUG: CPT | Performed by: OPHTHALMOLOGY

## 2021-09-10 ENCOUNTER — PROCEDURE (OUTPATIENT)
Dept: URBAN - METROPOLITAN AREA CLINIC 76 | Facility: CLINIC | Age: 86
End: 2021-09-10
Payer: COMMERCIAL

## 2021-09-10 PROCEDURE — 67028 INJECTION EYE DRUG: CPT | Performed by: OPHTHALMOLOGY

## 2021-10-18 ENCOUNTER — SURGERY (OUTPATIENT)
Dept: URBAN - METROPOLITAN AREA SURGERY 47 | Facility: SURGERY | Age: 86
End: 2021-10-18
Payer: COMMERCIAL

## 2021-10-18 PROCEDURE — 66821 AFTER CATARACT LASER SURGERY: CPT | Performed by: OPHTHALMOLOGY

## 2021-10-22 ENCOUNTER — OFFICE VISIT (OUTPATIENT)
Dept: URBAN - METROPOLITAN AREA CLINIC 76 | Facility: CLINIC | Age: 86
End: 2021-10-22
Payer: MEDICARE

## 2021-10-22 DIAGNOSIS — H35.3222 EXDTVE AGE-REL MCLR DEGN, LEFT EYE, WITH INACT CHRDL NEOVAS: ICD-10-CM

## 2021-10-22 PROCEDURE — 99213 OFFICE O/P EST LOW 20 MIN: CPT | Performed by: OPHTHALMOLOGY

## 2021-10-22 PROCEDURE — 92134 CPTRZ OPH DX IMG PST SGM RTA: CPT | Performed by: OPHTHALMOLOGY

## 2021-10-22 ASSESSMENT — INTRAOCULAR PRESSURE
OS: 11
OD: 11

## 2021-10-22 NOTE — IMPRESSION/PLAN
Impression: Exdtve age-rel mclr degn, left eye, with inact chrdl neovas: H35.3222. Left. Plan: OCT ordered and performed today. Discussed diagnosis with patient, Additional treatment is not recommended at this time but we will continue to monitor frequently. The patient was advised to continue to monitor.

## 2021-10-22 NOTE — IMPRESSION/PLAN
Impression: Exudative age-related macular degeneration, right eye, with inactive choroidal neovascularization: H35.3212. Right. Plan: OCT ordered and performed today. Discussed diagnosis with patient, Additional treatment is not recommended at this time but we will continue to monitor frequently. The patient was advised to continue AREDS multi-vitamins, monitor the amsler grid closely, monitor vision one eye at a time. Call immediately with any vision changes. Patient agrees with plan.

## 2021-10-26 ENCOUNTER — POST-OPERATIVE VISIT (OUTPATIENT)
Dept: URBAN - METROPOLITAN AREA CLINIC 81 | Facility: CLINIC | Age: 86
End: 2021-10-26
Payer: MEDICARE

## 2021-10-26 DIAGNOSIS — Z48.810 ENCOUNTER FOR SURGICAL AFTERCARE FOLLOWING SURGERY ON A SENSE ORGAN: Primary | ICD-10-CM

## 2021-10-26 PROCEDURE — 99024 POSTOP FOLLOW-UP VISIT: CPT | Performed by: OPTOMETRIST

## 2021-10-26 ASSESSMENT — INTRAOCULAR PRESSURE
OD: 14
OS: 14

## 2021-10-26 ASSESSMENT — VISUAL ACUITY: OD: 20/100

## 2021-10-26 NOTE — IMPRESSION/PLAN
Impression: S/P YAG Capsulotomy (Yttrium Aluminum [place] [employer]) OS - 8 Days. Encounter for surgical aftercare following surgery on a sense organ  Z48.810. Plan: -Stable. Continue care with Dr. Abran Portillo. -Advised to continue AT, recommended Systane Complete or Refresh Relieva OU BID-QID. Advised to RTC if epiphora or dry eye is worsening.

## 2021-11-04 ENCOUNTER — OFFICE VISIT (OUTPATIENT)
Dept: URBAN - METROPOLITAN AREA CLINIC 81 | Facility: CLINIC | Age: 86
End: 2021-11-04
Payer: MEDICARE

## 2021-11-04 PROCEDURE — 99213 OFFICE O/P EST LOW 20 MIN: CPT | Performed by: OPTOMETRIST

## 2021-11-04 RX ORDER — BACITRACIN ZINC AND POLYMYXIN B SULFATE 500; 10000 [USP'U]/G; [USP'U]/G
OINTMENT OPHTHALMIC
Qty: 3.5 | Refills: 2 | Status: INACTIVE
Start: 2021-11-04 | End: 2021-11-18

## 2021-11-04 ASSESSMENT — INTRAOCULAR PRESSURE
OS: 16
OD: 16

## 2021-11-04 NOTE — IMPRESSION/PLAN
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 Plan: Discussed diagnosis in detail with patient. Dry eye accounts for the patient's symptoms. Dry eye is a chronic condition and does not have a cure and will need artificial tears for maintenance. 
-Continue Systane Complete or Refresh Relieva OU QID longterm.
-Warm compresses as directed
-Recommend Tranquil eyes sleep goggles. -Can consider Restasis if NB. No steroid gtts recommended at this time due to glaucoma.

## 2021-11-04 NOTE — IMPRESSION/PLAN
Impression: Other specified inflammations of eyelid: H01.8. Bilateral. Plan: Blepharitis accounts for the patient's symptoms. Lid scrubs with diluted Randolph Shannan and Johnathan tear free baby shampoo as directed and Bacitracin-Polymyxin B ointment qhs OU and monitor.

## 2021-11-04 NOTE — IMPRESSION/PLAN
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 Plan: Discussed diagnosis with patient in detail. Start Bacitracin-Polymyxin B ointment qhs OU to affected outer eyelid margin and monitor. Discussed option of dry eye sleep mask, recommend Tranquil Eyes brand. Will continue to observe condition and/or symptoms. Patient instructed to call if condition gets worse. -If worsening consider Oculoplastic consult.

## 2021-11-18 ENCOUNTER — OFFICE VISIT (OUTPATIENT)
Dept: URBAN - METROPOLITAN AREA CLINIC 81 | Facility: CLINIC | Age: 86
End: 2021-11-18
Payer: MEDICARE

## 2021-11-18 DIAGNOSIS — H01.8 OTHER SPECIFIED INFLAMMATIONS OF EYELID: ICD-10-CM

## 2021-11-18 PROCEDURE — 99213 OFFICE O/P EST LOW 20 MIN: CPT | Performed by: OPTOMETRIST

## 2021-11-18 RX ORDER — DOXYCYCLINE HYCLATE 100 MG/1
100 MG CAPSULE, GELATIN COATED ORAL
Qty: 60 | Refills: 1 | Status: INACTIVE
Start: 2021-11-18 | End: 2021-11-18

## 2021-11-18 RX ORDER — NEOMYCIN SULFATE, POLYMYXIN B SULFATE AND DEXAMETHASONE 3.5; 10000; 1 MG/G; [USP'U]/G; MG/G
OINTMENT OPHTHALMIC
Qty: 3.5 | Refills: 1 | Status: INACTIVE
Start: 2021-11-18 | End: 2022-01-11

## 2021-11-18 ASSESSMENT — INTRAOCULAR PRESSURE
OS: 15
OD: 15

## 2021-11-18 NOTE — IMPRESSION/PLAN
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 Plan: Discussed diagnosis with patient in detail. D/C Bacitracin/Poly B raquel. Discussed option of dry eye sleep mask, recommend Tranquil Eyes brand. Will continue to observe condition and/or symptoms. Patient instructed to call if condition gets worse. If worsening consider Oculoplastic consult.

## 2021-11-18 NOTE — IMPRESSION/PLAN
Impression: Other specified inflammations of eyelid: H01.8. Bilateral. Plan: Blepharitis accounts for the patient's symptoms. Lid scrubs with diluted Guadalupe Hilding and Johnathan tear free baby shampoo as directed. Start Maxitrol raquel to outer affected eyelids OU QHS as directed. Start Doxycycline PO 100mg BID x 1 wk then QD.

## 2021-11-18 NOTE — IMPRESSION/PLAN
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

-superior cornea scarring OU Plan: Discussed diagnosis in detail with patient. Dry eye accounts for the patient's symptoms. Dry eye is a chronic condition and does not have a cure and will need artificial tears for maintenance. 
-Continue Systane Complete or Refresh Relieva OU QID longterm. -Recommend Tranquil eyes sleep goggles. -Can consider Restasis if NB. No steroid gtts recommended at this time due to glaucoma.

## 2021-11-18 NOTE — IMPRESSION/PLAN
Impression: Diagnosis: Primary open-angle glaucoma, bilateral, moderate stage. Code: P36.3817. IOP stable OU Plan: Discussed. Continue Brimonidine BID OU, Timolol BID OU. Repeat VF/OCT 5/2022.

## 2021-11-29 ENCOUNTER — OFFICE VISIT (OUTPATIENT)
Dept: URBAN - METROPOLITAN AREA CLINIC 81 | Facility: CLINIC | Age: 86
End: 2021-11-29
Payer: MEDICARE

## 2021-11-29 PROCEDURE — 99213 OFFICE O/P EST LOW 20 MIN: CPT | Performed by: OPTOMETRIST

## 2021-11-29 RX ORDER — CYCLOSPORINE 0.5 MG/ML
0.05 % EMULSION OPHTHALMIC
Qty: 180 | Refills: 3 | Status: INACTIVE
Start: 2021-11-29 | End: 2021-12-01

## 2021-11-29 ASSESSMENT — INTRAOCULAR PRESSURE
OS: 16
OD: 16

## 2021-11-29 NOTE — IMPRESSION/PLAN
Impression: Diagnosis: Primary open-angle glaucoma, bilateral, moderate stage. Code: B19.8877. IOP stable OU Plan: Discussed. Continue Brimonidine BID OU, Timolol BID OU. Repeat VF/OCT 5/2022.

## 2021-11-29 NOTE — IMPRESSION/PLAN
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

-superior cornea scarring OU Plan: Discussed diagnosis in detail with patient. Dry eye accounts for the patient's symptoms. Dry eye is a chronic condition and does not have a cure and will need artificial tears for maintenance. 
-Continue Systane Complete or Refresh Relieva, instill 1 drop in both eyes 4 times daily, longterm. -Recommend Tranquil eyes sleep goggles. -Start Restasis OU BID, samples and information dispensed, Restasis can take 4-6 weeks before improvement in noticed. No steroid gtts recommended at this time due to glaucoma. Can consider generic Klarity-C if Restasis is too expensive.

## 2021-11-29 NOTE — IMPRESSION/PLAN
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

-stable Plan: Discussed diagnosis with patient in detail. Discussed option of dry eye sleep mask, recommend Tranquil Eyes brand. Will continue to observe condition and/or symptoms. Patient instructed to call if condition gets worse. If worsening consider Oculoplastic consult.

## 2021-11-29 NOTE — IMPRESSION/PLAN
Impression: Other specified inflammations of eyelid: H01.8. Bilateral. Plan: Blepharitis accounts for the patient's symptoms. Lid scrubs with diluted Gwyndolyn Matteo and Johnathan tear free baby shampoo as directed. Continue Maxitrol ointment to outer affected eyelids of both eyes every other night. Continue Doxycycline PO 100mg once daily x 3 weeks and discontinue.

## 2021-12-03 ENCOUNTER — OFFICE VISIT (OUTPATIENT)
Dept: URBAN - METROPOLITAN AREA CLINIC 76 | Facility: CLINIC | Age: 86
End: 2021-12-03
Payer: MEDICARE

## 2021-12-03 PROCEDURE — 92134 CPTRZ OPH DX IMG PST SGM RTA: CPT | Performed by: OPHTHALMOLOGY

## 2021-12-03 PROCEDURE — 99213 OFFICE O/P EST LOW 20 MIN: CPT | Performed by: OPHTHALMOLOGY

## 2021-12-03 ASSESSMENT — INTRAOCULAR PRESSURE
OD: 15
OS: 15

## 2022-01-11 ENCOUNTER — OFFICE VISIT (OUTPATIENT)
Dept: URBAN - METROPOLITAN AREA CLINIC 81 | Facility: CLINIC | Age: 87
End: 2022-01-11
Payer: MEDICARE

## 2022-01-11 DIAGNOSIS — H02.125 MECHANICAL ECTROPION OF LEFT LOWER EYELID: ICD-10-CM

## 2022-01-11 DIAGNOSIS — H04.123 DRY EYE SYNDROME OF BILATERAL LACRIMAL GLANDS: Primary | ICD-10-CM

## 2022-01-11 PROCEDURE — 99213 OFFICE O/P EST LOW 20 MIN: CPT | Performed by: OPTOMETRIST

## 2022-01-11 RX ORDER — BACITRACIN ZINC AND POLYMYXIN B SULFATE 500; 10000 [USP'U]/G; [USP'U]/G
OINTMENT OPHTHALMIC
Qty: 3.5 | Refills: 3 | Status: INACTIVE
Start: 2022-01-11 | End: 2022-01-11

## 2022-01-11 ASSESSMENT — INTRAOCULAR PRESSURE
OD: 14
OS: 14

## 2022-01-11 NOTE — IMPRESSION/PLAN
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

-stable Plan: Discussed diagnosis with patient in detail. Discussed option of dry eye sleep mask, recommend Tranquil Eyes brand. Will continue to observe condition and/or symptoms. Patient instructed to call if condition gets worse. If worsening or symptoms of dry eye do not improve consider Oculoplastic consult.  D/C Maxitrol, Start Bacitracin-poly QHS, apply to outer lid margin

## 2022-01-11 NOTE — IMPRESSION/PLAN
Impression: Diagnosis: Primary open-angle glaucoma, bilateral, moderate stage. Code: V89.0231. IOP stable OU Plan: Discussed. Continue Brimonidine BID OU, Timolol BID OU. Repeat VF/OCT 5/2022.

## 2022-01-11 NOTE — IMPRESSION/PLAN
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

-superior cornea scarring OU Plan: Discussed diagnosis in detail with patient. Dry eye accounts for the patient's symptoms. Dry eye is a chronic condition and does not have a cure and will need artificial tears for maintenance. 
-Continue Systane Complete or Refresh Relieva, instill 1 drop in both eyes 4 times daily, longterm. -Recommend Tranquil eyes sleep goggles. -Continue Klarity-C OU BID. Advised that Klarity-C can take 4-6 weeks to start improving symptoms. No steroids gtts recommended at this time.

## 2022-03-07 ENCOUNTER — OFFICE VISIT (OUTPATIENT)
Dept: URBAN - METROPOLITAN AREA CLINIC 76 | Facility: CLINIC | Age: 87
End: 2022-03-07
Payer: MEDICARE

## 2022-03-07 PROCEDURE — 99213 OFFICE O/P EST LOW 20 MIN: CPT | Performed by: OPHTHALMOLOGY

## 2022-03-07 PROCEDURE — 92134 CPTRZ OPH DX IMG PST SGM RTA: CPT | Performed by: OPHTHALMOLOGY

## 2022-03-07 ASSESSMENT — INTRAOCULAR PRESSURE
OD: 12
OS: 15

## 2022-03-07 NOTE — IMPRESSION/PLAN
Impression: Diagnosis: Primary open-angle glaucoma, bilateral, moderate stage. Code: T95.8325. IOP stable OU Plan:  Continue Brimonidine BID OU, Timolol BID OU.

## 2022-03-07 NOTE — IMPRESSION/PLAN
Impression: Exudative age-rel mclr degn, bi, with inact chrdl neovas: H35.3232 Bilateral. Plan: OCT ordered and performed today. Discussed diagnosis with patient, Additional treatment is not recommended at this time but we will continue to monitor frequently. The patient was advised to continue AREDS multi-vitamins, monitor the amsler grid closely, monitor vision one eye at a time. Call immediately with any vision changes. Patient agrees with plan. Advised to see Dr. Carolina Mcneill as scheduled and he can refer to retina when needed.

## 2022-03-25 ENCOUNTER — OFFICE VISIT (OUTPATIENT)
Dept: URBAN - METROPOLITAN AREA CLINIC 76 | Facility: CLINIC | Age: 87
End: 2022-03-25
Payer: MEDICARE

## 2022-03-25 DIAGNOSIS — H10.45 OTHER CHRONIC ALLERGIC CONJUNCTIVITIS: ICD-10-CM

## 2022-03-25 DIAGNOSIS — H35.3211 EXDTVE AGE-REL MCLR DEGN, RIGHT EYE, WITH ACTV CHRDL NEOVAS: Primary | ICD-10-CM

## 2022-03-25 PROCEDURE — 67028 INJECTION EYE DRUG: CPT | Performed by: OPHTHALMOLOGY

## 2022-03-25 PROCEDURE — 92134 CPTRZ OPH DX IMG PST SGM RTA: CPT | Performed by: OPHTHALMOLOGY

## 2022-03-25 PROCEDURE — 99213 OFFICE O/P EST LOW 20 MIN: CPT | Performed by: OPHTHALMOLOGY

## 2022-03-25 PROCEDURE — 99213 OFFICE O/P EST LOW 20 MIN: CPT | Performed by: OPTOMETRIST

## 2022-03-25 RX ORDER — OLOPATADINE HYDROCHLORIDE OPHTHALMIC 1 MG/ML
0.1 % SOLUTION/ DROPS OPHTHALMIC
Qty: 15 | Refills: 3 | Status: ACTIVE
Start: 2022-03-25

## 2022-03-25 ASSESSMENT — INTRAOCULAR PRESSURE
OD: 16
OS: 16
OS: 16
OD: 16

## 2022-03-25 NOTE — IMPRESSION/PLAN
Impression: Exdtve age-rel mclr degn, left eye, with inact chrdl neovas: H35.3222.  Left. leakage in the OS Plan: Discussed diagnosis with patient, currently has a leakage, will refer to Dr. Cary Cardeans

## 2022-03-25 NOTE — IMPRESSION/PLAN
Impression: Other chronic allergic conjunctivitis: H10.45. Plan: Discussed diagnosis with patient. Recommend OTC oral antihistamine, and Ketotifen or Olopatadine  1 drop bid ou, prn. Rub excess into lids. Recommend refrigerating drops.

## 2022-03-25 NOTE — IMPRESSION/PLAN
Impression: Exdtve age-rel mclr degn, left eye, with inact chrdl neovas: H35.3222. Plan: OCT ordered and performed today. Discussed diagnosis with patient, Additional treatment is not recommended at this time but we will continue to monitor frequently. The patient was advised to continue AREDS multi-vitamins, monitor the amsler grid closely, monitor vision one eye at a time. Call immediately with any vision changes. Patient agrees with plan.

## 2022-03-25 NOTE — IMPRESSION/PLAN
Impression: Exdtve age-rel mclr degn, right eye, with actv chrdl neovas: H35.3211. Plan: OCT ordered and performed today. Discussed diagnosis in detail with patient. Discussed treatment options with patient. Discussed risks and benefits and patient understands. A 3 month series of Eylea (Sample today) Intirivitreal injection's, 1 EVERY MONTH  #1 today then #2 in 1 month, #3 in 2 month's, Then perform DE/OCT in 3 month's.

## 2022-03-30 ENCOUNTER — OFFICE VISIT (OUTPATIENT)
Dept: URBAN - METROPOLITAN AREA CLINIC 81 | Facility: CLINIC | Age: 87
End: 2022-03-30
Payer: MEDICARE

## 2022-03-30 DIAGNOSIS — H02.122 MECHANICAL ECTROPION OF RIGHT LOWER EYELID: ICD-10-CM

## 2022-03-30 PROCEDURE — 99214 OFFICE O/P EST MOD 30 MIN: CPT | Performed by: OPTOMETRIST

## 2022-03-30 RX ORDER — DOXYCYCLINE HYCLATE 100 MG/1
100 MG CAPSULE, GELATIN COATED ORAL
Qty: 60 | Refills: 1 | Status: ACTIVE
Start: 2022-03-30

## 2022-03-30 RX ORDER — BACITRACIN 500 [USP'U]/G
OINTMENT OPHTHALMIC
Qty: 3.5 | Refills: 4 | Status: INACTIVE
Start: 2022-03-30 | End: 2022-03-30

## 2022-03-30 ASSESSMENT — INTRAOCULAR PRESSURE
OS: 16
OD: 17

## 2022-03-30 NOTE — IMPRESSION/PLAN
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

-stable Plan: Discussed diagnosis with patient in detail. Discussed option of dry eye sleep mask, recommend Tranquil Eyes brand. Apply Bacitracin/poly B raquel OU QHS to affected exposed outer eyelids. Will continue to observe condition and/or symptoms. Patient instructed to call if condition gets worse. If worsening consider Oculoplastic consult.

## 2022-03-30 NOTE — IMPRESSION/PLAN
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

-superior cornea scarring OU Plan: Discussed diagnosis in detail with patient. Dry eye accounts for the patient's symptoms. Dry eye is a chronic condition and does not have a cure and will need artificial tears for maintenance. 
-Continue Systane Complete or Refresh Relieva, instill 1 drop in both eyes 4 times daily, longterm. -Recommend Tranquil eyes sleep goggles, wrote down recommendation and dispensed to patient today
-May consider re-starting Klarity-C at next. No steroids gtts recommended at this time.
-Prescribed Doxycycline 100mg PO BID x 1 week, then QD x 1 week, if symptoms increase while out of town ok to start.

## 2022-03-30 NOTE — IMPRESSION/PLAN
Impression: Other specified inflammations of eyelid: H01.8. Plan: Blepharitis accounts for the patient's symptoms. Lid scrubs with diluted Pattricia Mutters and Johnathan tear free baby shampoo as directed and Bacitracin ointment QHS OU to affected outer eyelids and monitor.

## 2022-04-12 ENCOUNTER — OFFICE VISIT (OUTPATIENT)
Dept: URBAN - METROPOLITAN AREA CLINIC 81 | Facility: CLINIC | Age: 87
End: 2022-04-12
Payer: MEDICARE

## 2022-04-12 DIAGNOSIS — H02.122 MECHANICAL ECTROPION OF RIGHT LOWER EYELID: ICD-10-CM

## 2022-04-12 DIAGNOSIS — H04.123 DRY EYE SYNDROME OF BILATERAL LACRIMAL GLANDS: Primary | ICD-10-CM

## 2022-04-12 DIAGNOSIS — H02.125 MECHANICAL ECTROPION OF LEFT LOWER EYELID: ICD-10-CM

## 2022-04-12 DIAGNOSIS — H01.8 OTHER SPECIFIED INFLAMMATIONS OF EYELID: ICD-10-CM

## 2022-04-12 PROCEDURE — 99213 OFFICE O/P EST LOW 20 MIN: CPT | Performed by: OPTOMETRIST

## 2022-04-12 RX ORDER — LOTEPREDNOL ETABONATE AND TOBRAMYCIN 5; 3 MG/ML; MG/ML
SUSPENSION/ DROPS OPHTHALMIC
Qty: 5 | Refills: 0 | Status: INACTIVE
Start: 2022-04-12 | End: 2022-04-14

## 2022-04-12 ASSESSMENT — INTRAOCULAR PRESSURE
OS: 16
OD: 16

## 2022-04-12 NOTE — IMPRESSION/PLAN
Impression: Other specified inflammations of eyelid: H01.8. Plan: Blepharitis accounts for the patient's symptoms. Lid scrubs with diluted Jacelyn Hodgkin and Johnathan tear free baby shampoo as directed and Bacitracin/Poly B ointment QHS OU to affected outer eyelids and monitor.

## 2022-04-12 NOTE — IMPRESSION/PLAN
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

-stable Plan: Discussed diagnosis with patient in detail. Continue to apply Bacitracin/poly B raquel OU QHS to affected exposed outer eyelids. Will continue to observe condition and/or symptoms. Patient instructed to call if condition gets worse. If worsening consider Oculoplastic consult.

## 2022-04-12 NOTE — IMPRESSION/PLAN
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

-superior cornea scarring OU
-sleeping better through the night
-Photophobia constant and not improving Plan: Discussed diagnosis in detail with patient. Dry eye accounts for the patient's symptoms. Dry eye is a chronic condition and does not have a cure and will need artificial tears for maintenance. 
-Continue Systane Complete or Refresh Relieva, instill 1 drop in both eyes 4 times daily, longterm. -Recommend Tranquil eyes sleep goggles, patient has them on order
-May consider re-starting Klarity-C at next
-Start Zylet QID OU x 1 week then BID x 1 week, sample given and sent to pharmacy. If too expensive, switch to Prednisolone same dosage. Steroid precautions reviewed. 
-Continue Doxycycline 100mg PO QD, for now

## 2022-04-26 ENCOUNTER — OFFICE VISIT (OUTPATIENT)
Dept: URBAN - METROPOLITAN AREA CLINIC 81 | Facility: CLINIC | Age: 87
End: 2022-04-26
Payer: MEDICARE

## 2022-04-26 DIAGNOSIS — H02.122 MECHANICAL ECTROPION OF RIGHT LOWER EYELID: ICD-10-CM

## 2022-04-26 DIAGNOSIS — H04.123 DRY EYE SYNDROME OF BILATERAL LACRIMAL GLANDS: Primary | ICD-10-CM

## 2022-04-26 PROCEDURE — 99213 OFFICE O/P EST LOW 20 MIN: CPT | Performed by: OPTOMETRIST

## 2022-04-26 ASSESSMENT — INTRAOCULAR PRESSURE
OS: 10
OD: 10

## 2022-04-26 NOTE — IMPRESSION/PLAN
Impression: Diagnosis: Primary open-angle glaucoma, bilateral, moderate stage. Code: U35.1829. IOP stable OU Plan: Discussed. IOP improved and well controlled OU. Continue Brimonidine BID OU, Timolol BID OU. Repeat VF/OCT 5/2022.

## 2022-04-26 NOTE — IMPRESSION/PLAN
Impression: Other specified inflammations of eyelid: H01.8.
-significant improvement Plan: Blepharitis accounts for the patient's symptoms. Lid scrubs with diluted Ceasar Mccann and Johnathan tear free baby shampoo as directed and Bacitracin/Poly B ointment QHS OU to affected outer eyelids and monitor. Consider trial of Avenova at f/up.

## 2022-04-26 NOTE — IMPRESSION/PLAN
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
-significant improvement in signs and symptoms today, photophobia resolved
-superior cornea scarring OU
-sleeping better through the night Plan: Discussed diagnosis in detail with patient. Dry eye accounts for the patient's symptoms. Dry eye is a chronic condition and does not have a cure and will need artificial tears for maintenance. 
-Continue Systane Complete, instill 1 drop in both eyes 4 times daily, longterm.
-Continue Tranquil eyes sleep goggles, patient has them on order
-Taper Prednisolone OU BID x 1 week then QAM x 1 week and discontinue. Steroid precautions reviewed. -Continue Doxycycline 100mg PO QD x 2 weeks and D/C, education on probiotics -May consider re-starting Klarity-C at next

## 2022-05-17 ENCOUNTER — OFFICE VISIT (OUTPATIENT)
Dept: URBAN - METROPOLITAN AREA CLINIC 81 | Facility: CLINIC | Age: 87
End: 2022-05-17
Payer: MEDICARE

## 2022-05-17 DIAGNOSIS — H40.1132 PRIMARY OPEN-ANGLE GLAUCOMA, BILATERAL, MODERATE STAGE: ICD-10-CM

## 2022-05-17 DIAGNOSIS — H17.89 OTHER CORNEAL SCARS AND OPACITIES: ICD-10-CM

## 2022-05-17 DIAGNOSIS — H01.8 OTHER SPECIFIED INFLAMMATIONS OF EYELID: ICD-10-CM

## 2022-05-17 PROCEDURE — 92285 EXTERNAL OCULAR PHOTOGRAPHY: CPT | Performed by: OPTOMETRIST

## 2022-05-17 PROCEDURE — 99213 OFFICE O/P EST LOW 20 MIN: CPT | Performed by: OPTOMETRIST

## 2022-05-17 ASSESSMENT — INTRAOCULAR PRESSURE
OD: 12
OS: 12

## 2022-05-17 NOTE — IMPRESSION/PLAN
Impression: Other specified inflammations of eyelid: H01.8.
-stable OU Plan: Blepharitis accounts for the patient's symptoms. Lid scrubs with diluted Bree Alex and Johnathan tear free baby shampoo as directed and restart Bacitracin/Poly B ointment QHS OU to affected outer eyelids and monitor.

## 2022-05-17 NOTE — IMPRESSION/PLAN
Impression: Diagnosis: Primary open-angle glaucoma, bilateral, moderate stage. Code: H99.2693. IOP stable OU Plan: Discussed. IOP stable and well controlled OU. Continue Brimonidine BID OU, Timolol BID OU.

## 2022-05-17 NOTE — IMPRESSION/PLAN
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
-significant improvement in signs and symptoms today, photophobia resolved
-superior cornea scarring OU
-sleeping better through the night Plan: Discussed diagnosis in detail with patient. Dry eye accounts for the patient's symptoms. Dry eye is a chronic condition and does not have a cure and will need artificial tears for maintenance. 
-Continue Systane Complete, instill 1 drop in both eyes 4 times daily, longterm. -Restart Klarity C OU BID, discussed that it takes about 4-6 weeks before noticing improvement. If symptoms worsen after starting, D/C.
-Continue Tranquil eyes sleep goggles -observe off Prednisolone and Doxycycline PO

## 2022-05-17 NOTE — IMPRESSION/PLAN
Impression: Other corneal scars and opacities: H17.89.

-OS>OD
-recommend baseline external photos, baseline external photos taken today, reviewed with patient. Plan: Discussed. Observe. Same as plan 1 and 3.

## 2022-05-24 ENCOUNTER — OFFICE VISIT (OUTPATIENT)
Dept: URBAN - METROPOLITAN AREA CLINIC 64 | Facility: CLINIC | Age: 87
End: 2022-05-24
Payer: MEDICARE

## 2022-05-24 DIAGNOSIS — H35.3211 EXDTVE AGE-REL MCLR DEGN, RIGHT EYE, WITH ACTV CHRDL NEOVAS: Primary | ICD-10-CM

## 2022-05-24 DIAGNOSIS — H35.3231 BILATERAL EXUDATIVE AGE-RELATED MACULAR DEGENERATION W/ ACTIVE CHOROIDAL NEOVASCULARIZATION: ICD-10-CM

## 2022-05-24 DIAGNOSIS — H02.125 MECHANICAL ECTROPION OF LEFT LOWER EYELID: ICD-10-CM

## 2022-05-24 PROCEDURE — 92134 CPTRZ OPH DX IMG PST SGM RTA: CPT | Performed by: OPHTHALMOLOGY

## 2022-05-24 PROCEDURE — 99204 OFFICE O/P NEW MOD 45 MIN: CPT | Performed by: OPHTHALMOLOGY

## 2022-05-24 ASSESSMENT — INTRAOCULAR PRESSURE
OD: 10
OS: 8

## 2022-05-24 NOTE — IMPRESSION/PLAN
Impression: WET AMD OS prior then OD N40.5561 PRIOR injx Stella Tucker Plan: Hx: [[Prior injx OS remote. Periodic recur Hmg / CME OS -- NEW CNVM '21 OD -- Prior injx Eylea Dr. Apoorva Aranda. SCAR OS limiting]] TODAY new to [facility] -- TODAY Recur ADD LUCENTIS OU (Proc note) RTC 6-8w dil, OCT, eval - h/o ZAIN OU inj. Future HRA? FUTURE may go OD, OU, OD, OU (Extending injx in [address]) Repeated EyLea injection was added today for persisting activity. All r/b/a reviewed w pt. On-label FDA-approved drug, yet remains theoretical low (but not zero) risk adverse ocular event or ATE---Understood. Costs / expense / risk understood and accepted.

## 2022-05-24 NOTE — IMPRESSION/PLAN
Impression: Mechanical ectropion of LLL
  IOP  care / tearing Plan: ALL GEN eye care -- Lids, ectropion, tearing, IOP, Gtts, conjunctivitis, etc -- keep in the care of DR. Norah Jensen and Serafin Pruitt

## 2022-06-22 ENCOUNTER — OFFICE VISIT (OUTPATIENT)
Dept: URBAN - METROPOLITAN AREA CLINIC 81 | Facility: CLINIC | Age: 87
End: 2022-06-22
Payer: MEDICARE

## 2022-06-22 DIAGNOSIS — H40.1132 PRIMARY OPEN-ANGLE GLAUCOMA, BILATERAL, MODERATE STAGE: ICD-10-CM

## 2022-06-22 DIAGNOSIS — H01.8 OTHER SPECIFIED INFLAMMATIONS OF EYELID: ICD-10-CM

## 2022-06-22 DIAGNOSIS — H04.123 DRY EYE SYNDROME OF BILATERAL LACRIMAL GLANDS: Primary | ICD-10-CM

## 2022-06-22 PROCEDURE — 99213 OFFICE O/P EST LOW 20 MIN: CPT | Performed by: OPTOMETRIST

## 2022-06-22 RX ORDER — PREDNISOLONE ACETATE 10 MG/ML
1 % SUSPENSION/ DROPS OPHTHALMIC
Qty: 5 | Refills: 0 | Status: ACTIVE
Start: 2022-06-22

## 2022-06-22 RX ORDER — BACITRACIN ZINC AND POLYMYXIN B SULFATE 500; 10000 [USP'U]/G; [USP'U]/G
OINTMENT OPHTHALMIC
Qty: 3.5 | Refills: 3 | Status: ACTIVE
Start: 2022-06-22

## 2022-06-22 ASSESSMENT — INTRAOCULAR PRESSURE
OS: 12
OS: 14
OD: 9

## 2022-06-22 NOTE — IMPRESSION/PLAN
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
-stable in both eyes, photophobia resolved
-superior cornea scarring OU stable
-sleeping better through the night Plan: Discussed diagnosis in detail with patient. Dry eye accounts for the patient's symptoms. Dry eye is a chronic condition and does not have a cure and will need artificial tears for maintenance. 
-Continue Systane Complete, instill 1 drop in both eyes 4 times daily, longterm.
-Continue Klarity C OU BID. -Updated Prescription for Prednisolone acetate. Patient is going out of town, advised to use Prednisolone acetate both eyes 4x daily for 1 week than one time daily for 1 week and than discontinue. Patient to use Prednisolone only if dry eye flare up occurs when out of town.  
-Continue Tranquil eyes sleep goggles

## 2022-06-22 NOTE — IMPRESSION/PLAN
Impression: Diagnosis: Primary open-angle glaucoma, bilateral, moderate stage. Code: A80.2553. IOP stable OU

-baseline pachymetry,  and 
-05/13/2021 OCT ON, normal RNFL OU
-05/13/2021 VF OD nonspecific defects and OS central loss Plan: Discussed condition. IOP stable and well controlled OU. Continue Brimonidine BID OU, Timolol BID OU.

## 2022-06-22 NOTE — IMPRESSION/PLAN
Impression: Other specified inflammations of eyelid: H01.8.
-stable OU Plan: Blepharitis accounts for the patient's symptoms. Lid scrubs with diluted Randolph Shannan and Johnathan tear free baby shampoo as directed and Continue Bacitracin/Poly B ointment QHS OU to affected outer eyelids and monitor.

## 2022-07-12 ENCOUNTER — OFFICE VISIT (OUTPATIENT)
Dept: URBAN - METROPOLITAN AREA CLINIC 10 | Facility: CLINIC | Age: 87
End: 2022-07-12
Payer: MEDICARE

## 2022-07-12 DIAGNOSIS — H02.125 MECHANICAL ECTROPION OF LEFT LOWER EYELID: ICD-10-CM

## 2022-07-12 DIAGNOSIS — H35.3231 EXUDATIVE AGE-RELATED MACULAR DEGENERATION, BILATERAL, WITH ACTIVE CHOROIDAL NEOVASCULARIZATION: Primary | ICD-10-CM

## 2022-07-12 PROCEDURE — 92134 CPTRZ OPH DX IMG PST SGM RTA: CPT | Performed by: OPHTHALMOLOGY

## 2022-07-12 PROCEDURE — 99214 OFFICE O/P EST MOD 30 MIN: CPT | Performed by: OPHTHALMOLOGY

## 2022-07-12 ASSESSMENT — INTRAOCULAR PRESSURE
OD: 10
OS: 10

## 2022-07-12 NOTE — IMPRESSION/PLAN
Impression: Mechanical ectropion of LLL
  IOP  care / tearing Plan: TODAY repeated exam shows ectropion LLL. IOP fair. Tearing is reduced but not resolved. Reminded pt: ALL GEN eye care -- Lids, ectropion, tearing, IOP, Gtts, conjunctivitis, etc -- keep in the care of DR. Lizabeth Gan and Radha Rincon

## 2022-07-12 NOTE — IMPRESSION/PLAN
Impression: WET AMD OS prior then OD Z24.5527 PRIOR injx Stella Tucker Plan: Hx: [[Prior injx OS remote. Periodic recur Hmg / CME OS -- NEW CNVM '21 OD -- Prior injx Angelic Taveras. SCAR OS limiting -- NEW to [place] [facility] '22]] TODAY inj LUCENTIS OU (Proc note -- RECUR ' 22) RTC 6-8w pos HRA / plan ZAIN OD inj. Future ND?  
FUTURE may go OD, OU, OD, OU (Extending injx in [address])

## 2022-08-12 ENCOUNTER — OFFICE VISIT (OUTPATIENT)
Facility: LOCATION | Age: 87
End: 2022-08-12
Payer: MEDICARE

## 2022-08-12 DIAGNOSIS — H35.3231 EXUDATIVE AGE-RELATED MACULAR DEGENERATION, BILATERAL, WITH ACTIVE CHOROIDAL NEOVASCULARIZATION: Primary | ICD-10-CM

## 2022-08-12 DIAGNOSIS — Z96.1 PRESENCE OF INTRAOCULAR LENS: ICD-10-CM

## 2022-08-12 PROCEDURE — 67028 INJECTION EYE DRUG: CPT | Performed by: OPHTHALMOLOGY

## 2022-08-12 PROCEDURE — 99204 OFFICE O/P NEW MOD 45 MIN: CPT | Performed by: OPHTHALMOLOGY

## 2022-08-12 PROCEDURE — 92134 CPTRZ OPH DX IMG PST SGM RTA: CPT | Performed by: OPHTHALMOLOGY

## 2022-08-12 ASSESSMENT — INTRAOCULAR PRESSURE
OD: 10
OS: 7

## 2022-08-12 NOTE — IMPRESSION/PLAN
Impression: Exudative age-related macular degeneration, bilateral, with active choroidal neovascularization: H35.3231. OCT OU = no SRF/IRF OU scarring OS  / 211 
s/p Jason OU 07/12/2022 Plan: OD: resolved activity and better eye OS: atrophic scarring, limited VA benefit RBA's d/w patient in detail. Would consider T&E OD +/- OS Patient elects treatment OD Discussed R,B,A of Avastin vs Lucentis vs Eylea vs Beovu vs Vabysmo injection. Discussed no FDA approval with Avastin and compounding risk. Discussed signs and symptoms of inflammation, endophthalmitis, vitreous hemorrhage, retinal tear, retinal detachment. Patient understands and wishes to proceed with Avastin injection today. Timeout was performed before procedure. AVASTIN INJECTION COMPLETED TODAY as per protocol without complications. 

1m OCT OU re-eval Eylea OU

## 2022-10-05 ENCOUNTER — OFFICE VISIT (OUTPATIENT)
Dept: URBAN - METROPOLITAN AREA CLINIC 81 | Facility: CLINIC | Age: 87
End: 2022-10-05
Payer: MEDICARE

## 2022-10-05 DIAGNOSIS — H02.125 MECHANICAL ECTROPION OF LEFT LOWER EYELID: ICD-10-CM

## 2022-10-05 DIAGNOSIS — H40.1132 PRIMARY OPEN-ANGLE GLAUCOMA, BILATERAL, MODERATE STAGE: Primary | ICD-10-CM

## 2022-10-05 DIAGNOSIS — H04.123 DRY EYE SYNDROME OF BILATERAL LACRIMAL GLANDS: ICD-10-CM

## 2022-10-05 PROCEDURE — 92133 CPTRZD OPH DX IMG PST SGM ON: CPT | Performed by: OPTOMETRIST

## 2022-10-05 PROCEDURE — 99213 OFFICE O/P EST LOW 20 MIN: CPT | Performed by: OPTOMETRIST

## 2022-10-05 RX ORDER — BRIMONIDINE TARTRATE AND TIMOLOL MALEATE 2; 5 MG/ML; MG/ML
SOLUTION/ DROPS OPHTHALMIC
Qty: 10 | Refills: 4 | Status: ACTIVE
Start: 2022-10-05

## 2022-10-05 ASSESSMENT — INTRAOCULAR PRESSURE
OS: 11
OD: 12

## 2022-10-05 NOTE — IMPRESSION/PLAN
Impression: Diagnosis: Primary open-angle glaucoma, bilateral, moderate stage. Code: D99.1920. -IOP stable OU and tolerating drops OU
-baseline pachymetry,  and 
-10/05/2022 order and performed OCT ON, OD normal RNFL and OS borderline RNFL inf, stable OU
-05/13/2021 VF OD nonspecific defects and OS central loss Plan: Discussed condition with patient. Continue Brimonidine BID OU and Timolol BID OU, sent Brimonidine/Timolol combination bottle to [facility] [employer] [facility] mail order pharmacy, once receives combination bottle can drop using separate bottles of Brimonidine and Timolol. If combination bottle not covered by insurance continue Brimonidine and Timolol OU BID in separate bottles as directed. Monitor.

## 2022-10-05 NOTE — IMPRESSION/PLAN
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
-stable in both eyes
-superior cornea scarring OU stable
-sleeping better through the night Plan: Discussed diagnosis in detail with patient. Dry eye accounts for the patient's symptoms.  Dry eye is a chronic condition and does not have a cure and will need artificial tears for maintenance. 
-Continue Systane Complete, instill 1 drop in both eyes 4 times daily, longterm.
-Continue Klarity C OU BID. 
-Can use Tranquil eyes sleep goggles

## 2022-10-11 ENCOUNTER — OFFICE VISIT (OUTPATIENT)
Dept: URBAN - METROPOLITAN AREA CLINIC 64 | Facility: CLINIC | Age: 87
End: 2022-10-11
Payer: MEDICARE

## 2022-10-11 DIAGNOSIS — H35.3231 EXUDATIVE AGE-RELATED MACULAR DEGENERATION, BILATERAL, WITH ACTIVE CHOROIDAL NEOVASCULARIZATION: Primary | ICD-10-CM

## 2022-10-11 DIAGNOSIS — H04.123 DRY EYE SYNDROME OF BILATERAL LACRIMAL GLANDS: ICD-10-CM

## 2022-10-11 PROCEDURE — 92242 FLUORESCEIN&ICG ANGIOGRAPHY: CPT | Performed by: OPHTHALMOLOGY

## 2022-10-11 PROCEDURE — 92134 CPTRZ OPH DX IMG PST SGM RTA: CPT | Performed by: OPHTHALMOLOGY

## 2022-10-11 PROCEDURE — 92250 FUNDUS PHOTOGRAPHY W/I&R: CPT | Performed by: OPHTHALMOLOGY

## 2022-10-11 PROCEDURE — 67028 INJECTION EYE DRUG: CPT | Performed by: OPHTHALMOLOGY

## 2022-10-11 ASSESSMENT — INTRAOCULAR PRESSURE
OS: 16
OD: 16

## 2022-10-11 NOTE — IMPRESSION/PLAN
Impression: WET AMD OS prior then OD Y85.5680 PRIOR injx Stella Tucker Plan: Hx: [[Prior injx OS remote. Periodic recur Hmg / CME OS -- NEW CNVM '21 OD -- Prior injx Eyvancea Dr. Tomy Mistry. SCAR OS limiting -- NEW to [place] [facility] '22]] TODAY inj LUCENTIS OD (Proc note -- RECUR ' 22 -- SCAR OS) RTC 6-8w ND/inj/pos OCT -  plan ZAIN OU inj.       Future exam? 
FUTURE may go OD, OU, OD, OU (Extending injx in [place] OS)

## 2022-12-09 ENCOUNTER — OFFICE VISIT (OUTPATIENT)
Dept: URBAN - METROPOLITAN AREA CLINIC 10 | Facility: CLINIC | Age: 87
End: 2022-12-09
Payer: MEDICARE

## 2022-12-09 DIAGNOSIS — H35.3231 EXUDATIVE AGE-RELATED MACULAR DEGENERATION, BILATERAL, WITH ACTIVE CHOROIDAL NEOVASCULARIZATION: Primary | ICD-10-CM

## 2022-12-09 PROCEDURE — 92134 CPTRZ OPH DX IMG PST SGM RTA: CPT | Performed by: OPHTHALMOLOGY

## 2022-12-09 ASSESSMENT — INTRAOCULAR PRESSURE
OS: 14
OD: 15

## 2022-12-09 NOTE — IMPRESSION/PLAN
Impression: WET AMD OS prior then OD R45.2368 PRIOR injx Stella Tucker Plan: Hx: [[Prior injx OS remote. Periodic recur Hmg / CME OS -- NEW CNVM '21 OD -- Prior injx Eylea Dr. Mckenzie Castillo. SCAR OS limiting -- NEW to [place] [facility] '22]] TODAY inj LUCENTIS OU (Proc note -- RECUR ' 22 -- SCAR OS) RTC 6-8w dil, pos COLORS, eval -- h/o EYLEA OD inj. Future ND? FUTURE may go OD, OU, OD, OU (Extending injx in [address]) RE-APPLY to [facility] -- issues w Eylea coverage in '22 dentures

## 2023-01-27 ENCOUNTER — OFFICE VISIT (OUTPATIENT)
Dept: URBAN - METROPOLITAN AREA CLINIC 10 | Facility: CLINIC | Age: 88
End: 2023-01-27
Payer: MEDICARE

## 2023-01-27 DIAGNOSIS — H35.3231 EXUDATIVE AGE-RELATED MACULAR DEGENERATION, BILATERAL, WITH ACTIVE CHOROIDAL NEOVASCULARIZATION: Primary | ICD-10-CM

## 2023-01-27 DIAGNOSIS — H02.125 MECHANICAL ECTROPION OF LEFT LOWER EYELID: ICD-10-CM

## 2023-01-27 PROCEDURE — 99214 OFFICE O/P EST MOD 30 MIN: CPT | Performed by: OPHTHALMOLOGY

## 2023-01-27 PROCEDURE — 67028 INJECTION EYE DRUG: CPT | Performed by: OPHTHALMOLOGY

## 2023-01-27 PROCEDURE — 92134 CPTRZ OPH DX IMG PST SGM RTA: CPT | Performed by: OPHTHALMOLOGY

## 2023-01-27 ASSESSMENT — INTRAOCULAR PRESSURE
OS: 9
OD: 10

## 2023-01-27 NOTE — IMPRESSION/PLAN
Impression: Mechanical ectropion of LLL
  IOP  care / tearing Plan:   Tearing is reduced but not resolved. TODAY repeated exam shows ectropion LLL. IOP fair. Reminded pt: ALL GEN eye care -- Lids, ectropion, tearing, IOP, Gtts, conjunctivitis, etc -- keep in the care of DR. Josse Weiss and Lilian Alex.

## 2023-03-10 ENCOUNTER — OFFICE VISIT (OUTPATIENT)
Dept: URBAN - METROPOLITAN AREA CLINIC 76 | Facility: CLINIC | Age: 88
End: 2023-03-10
Payer: MEDICARE

## 2023-03-10 DIAGNOSIS — H35.3231 EXUDATIVE AGE-RELATED MACULAR DEGENERATION, BILATERAL, WITH ACTIVE CHOROIDAL NEOVASCULARIZATION: Primary | ICD-10-CM

## 2023-03-10 DIAGNOSIS — H35.3113 NONEXUDATIVE MACULAR DEGENERATION, ADVANCED ATROPHIC WITHOUT SUBFOVEAL INVOLVEMENT, RIGHT EYE: ICD-10-CM

## 2023-03-10 PROCEDURE — 99214 OFFICE O/P EST MOD 30 MIN: CPT | Performed by: OPHTHALMOLOGY

## 2023-03-10 PROCEDURE — 92134 CPTRZ OPH DX IMG PST SGM RTA: CPT | Performed by: OPHTHALMOLOGY

## 2023-03-10 PROCEDURE — 67028 INJECTION EYE DRUG: CPT | Performed by: OPHTHALMOLOGY

## 2023-03-10 ASSESSMENT — INTRAOCULAR PRESSURE
OS: 14
OD: 13

## 2023-03-10 NOTE — IMPRESSION/PLAN
Impression: WET AMD OS prior then OD Q09.6249 PRIOR injx Stella Tucker Plan: OD better-seeing eye, FRANCISCA OD today, RTC 8 weeks (currently at q6 weeks), max treatment interval of q8 weeks. CF Va OS and without CNV-related activity today > HOLD injections OS for now and treat-as-needed going forward. AREDS 2 and Amsler grid checks.

## 2023-03-10 NOTE — IMPRESSION/PLAN
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 Plan: Excellent candidate for SYFOVRE w/ juxtafoveal GA and currently getting anti-VEGF injections. Will get prior auth and start when able.

## 2023-05-05 ENCOUNTER — PROCEDURE (OUTPATIENT)
Dept: URBAN - METROPOLITAN AREA CLINIC 76 | Facility: CLINIC | Age: 88
End: 2023-05-05
Payer: MEDICARE

## 2023-05-05 DIAGNOSIS — H16.223 KERATOCONJUNCTIVITIS SICCA, BILATERAL: ICD-10-CM

## 2023-05-05 DIAGNOSIS — H35.3231 BILATERAL EXUDATIVE AGE-RELATED MACULAR DEGENERATION W/ ACTIVE CHOROIDAL NEOVASCULARIZATION: Primary | ICD-10-CM

## 2023-05-05 DIAGNOSIS — H35.3113 NONEXUDATIVE MACULAR DEGENERATION, ADVANCED ATROPHIC WITHOUT SUBFOVEAL INVOLVEMENT, RIGHT EYE: ICD-10-CM

## 2023-05-05 PROCEDURE — 92134 CPTRZ OPH DX IMG PST SGM RTA: CPT | Performed by: OPHTHALMOLOGY

## 2023-05-05 PROCEDURE — 67028 INJECTION EYE DRUG: CPT | Performed by: OPHTHALMOLOGY

## 2023-05-05 ASSESSMENT — INTRAOCULAR PRESSURE
OD: 14
OS: 14

## 2023-05-05 NOTE — IMPRESSION/PLAN
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 Plan: Excellent candidate for SYFOVRE w/ juxtafoveal GA and currently getting anti-VEGF injections. Will get prior auth and start at next visit.

## 2023-05-05 NOTE — IMPRESSION/PLAN
Impression: Keratoconjunctivitis sicca, bilateral: S80.905. Plan: Significant and limiting ability to get optimal OCT images. Schedule next available consult w/ OD.

## 2023-05-05 NOTE — IMPRESSION/PLAN
Impression: WET AMD OS prior then OD P41.3639 Plan: OD better-seeing eye, FRANCISCA OD today, RTC 8 weeks, max treatment interval of q8 weeks. CF Va OS and without CNV-related activity today > HOLD injections OS for now and treat-as-needed going forward. AREDS 2 and Amsler grid checks. RTC 8 weeks nDFEx/OCT mac/EYLEA OD/SYFOVRE (needs auth) OD - will treat 30-40 min apart.

## 2023-05-11 ENCOUNTER — OFFICE VISIT (OUTPATIENT)
Dept: URBAN - METROPOLITAN AREA CLINIC 76 | Facility: CLINIC | Age: 88
End: 2023-05-11
Payer: MEDICARE

## 2023-05-11 DIAGNOSIS — H02.129 MECHANICAL ECTROPION OF UNSPECIFIED EYE, UNSPECIFIED EYELID: ICD-10-CM

## 2023-05-11 DIAGNOSIS — H10.45 OTHER CHRONIC ALLERGIC CONJUNCTIVITIS: Primary | ICD-10-CM

## 2023-05-11 PROCEDURE — 99213 OFFICE O/P EST LOW 20 MIN: CPT | Performed by: OPTOMETRIST

## 2023-05-11 RX ORDER — FLUOROMETHOLONE ACETATE 1 MG/ML
0.1 % SUSPENSION/ DROPS OPHTHALMIC
Qty: 10 | Refills: 3 | Status: ACTIVE
Start: 2023-05-11

## 2023-05-11 ASSESSMENT — INTRAOCULAR PRESSURE
OD: 12
OS: 12

## 2023-05-11 ASSESSMENT — KERATOMETRY
OS: 42.75
OD: 46.00

## 2023-05-11 NOTE — IMPRESSION/PLAN
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 Bilateral. Plan: If not improvement at next exam, needs consult with Oculoplastics for further treatment, schedule appt in [place].

## 2023-05-11 NOTE — IMPRESSION/PLAN
Impression: Other chronic allergic conjunctivitis: H10.45. Bilateral. Plan: Discussed diagnosis with patient. Recommend OTC oral antihistamine. Start Ketotifen three times per day in both eyes. Rub excess into lids. Recommend refrigerating drops. Cool compresses help as well. Start Flarex three times a day in both eyes, then slow taper once eyes start feeling better. Twice a day for one week and once a day for one more week then stop. Sent Rx to [facility].

## 2023-06-05 ENCOUNTER — OFFICE VISIT (OUTPATIENT)
Dept: URBAN - METROPOLITAN AREA CLINIC 76 | Facility: CLINIC | Age: 88
End: 2023-06-05
Payer: MEDICARE

## 2023-06-05 DIAGNOSIS — H16.223 KERATOCONJUNCTIVITIS SICCA, BILATERAL: Primary | ICD-10-CM

## 2023-06-05 DIAGNOSIS — H10.45 OTHER CHRONIC ALLERGIC CONJUNCTIVITIS: ICD-10-CM

## 2023-06-05 PROCEDURE — 99213 OFFICE O/P EST LOW 20 MIN: CPT | Performed by: OPTOMETRIST

## 2023-06-05 RX ORDER — FLUOROMETHOLONE ACETATE 1 MG/ML
0.1 % SUSPENSION/ DROPS OPHTHALMIC
Qty: 10 | Refills: 3 | Status: ACTIVE
Start: 2023-06-05

## 2023-06-05 ASSESSMENT — INTRAOCULAR PRESSURE
OD: 9
OS: 8

## 2023-06-05 NOTE — IMPRESSION/PLAN
Impression: Keratoconjunctivitis sicca, bilateral: M54.148. Plan: Discussed chronic nature of condition in detail with patient. Discussed dry eye as cause of fluctuating vision. Recommend Artificial tears four times a day, for continuous maintenance of tear film. Re-discussed diet for dry eyes.

## 2023-06-05 NOTE — IMPRESSION/PLAN
Impression: Other chronic allergic conjunctivitis: H10.45. Bilateral. Plan: Continue Flarex BID OU for 2 more weeks then use only as rescue drop.

## 2023-06-30 ENCOUNTER — PROCEDURE (OUTPATIENT)
Dept: URBAN - METROPOLITAN AREA CLINIC 76 | Facility: CLINIC | Age: 88
End: 2023-06-30
Payer: MEDICARE

## 2023-06-30 DIAGNOSIS — H35.3113 NONEXUDATIVE MACULAR DEGENERATION, ADVANCED ATROPHIC WITHOUT SUBFOVEAL INVOLVEMENT, RIGHT EYE: ICD-10-CM

## 2023-06-30 DIAGNOSIS — H35.3231 BILATERAL EXUDATIVE AGE-RELATED MACULAR DEGENERATION W/ ACTIVE CHOROIDAL NEOVASCULARIZATION: Primary | ICD-10-CM

## 2023-06-30 PROCEDURE — 67028 INJECTION EYE DRUG: CPT | Performed by: OPHTHALMOLOGY

## 2023-06-30 PROCEDURE — 92134 CPTRZ OPH DX IMG PST SGM RTA: CPT | Performed by: OPHTHALMOLOGY

## 2023-06-30 ASSESSMENT — INTRAOCULAR PRESSURE
OD: 10
OS: 5

## 2023-06-30 NOTE — IMPRESSION/PLAN
Impression: WET AMD OS prior then OD H35.6456 Plan: OD better-seeing eye, FRANCISCA OD today, RTC 8 weeks, max treatment interval of q8 weeks. CF Va OS and without CNV-related activity today > HOLD injections OS for now and treat-as-needed going forward. AREDS 2 and Amsler grid checks. RTC 8 weeks nDFEx/OCT mac/EYLEA OD/SYFOVRE (SAMPLE) OD - will treat 30-40 min apart.

## 2023-06-30 NOTE — IMPRESSION/PLAN
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 Plan: Excellent candidate for SYFOVRE w/ juxtafoveal GA; SYFOVRE (SAMPLE) OD today, will treat q8 weeks.

## 2023-08-25 ENCOUNTER — PROCEDURE (OUTPATIENT)
Dept: URBAN - METROPOLITAN AREA CLINIC 76 | Facility: CLINIC | Age: 88
End: 2023-08-25
Payer: MEDICARE

## 2023-08-25 DIAGNOSIS — H35.3113 NONEXUDATIVE MACULAR DEGENERATION, ADVANCED ATROPHIC WITHOUT SUBFOVEAL INVOLVEMENT, RIGHT EYE: ICD-10-CM

## 2023-08-25 DIAGNOSIS — H35.3231 EXUDATIVE AGE-RELATED MACULAR DEGENERATION, BILATERAL, WITH ACTIVE CHOROIDAL NEOVASCULARIZATION: Primary | ICD-10-CM

## 2023-08-25 PROCEDURE — 92134 CPTRZ OPH DX IMG PST SGM RTA: CPT | Performed by: OPHTHALMOLOGY

## 2023-08-25 ASSESSMENT — INTRAOCULAR PRESSURE
OD: 14
OS: 14

## 2023-09-05 ENCOUNTER — TESTING ONLY (OUTPATIENT)
Dept: URBAN - METROPOLITAN AREA CLINIC 76 | Facility: CLINIC | Age: 88
End: 2023-09-05

## 2023-09-05 DIAGNOSIS — H52.4 PRESBYOPIA: Primary | ICD-10-CM

## 2023-09-05 ASSESSMENT — VISUAL ACUITY
OD: 20/70
OS: 20/200

## 2023-09-05 ASSESSMENT — KERATOMETRY
OS: 45.13
OD: 44.63

## 2023-09-14 NOTE — IMPRESSION/PLAN
Impression: Dry eye syndrome of bilateral lacrimal glands: H04.123 OU. Condition: established, stable. Plan: Dry eyes account for the patient's complaints. There is no evidence of permanent changes to the cornea. Explained condition does not have a cure and will need artificial tears for maintenance. No

## 2023-10-20 ENCOUNTER — OFFICE VISIT (OUTPATIENT)
Dept: URBAN - METROPOLITAN AREA CLINIC 76 | Facility: CLINIC | Age: 88
End: 2023-10-20
Payer: MEDICARE

## 2023-10-20 DIAGNOSIS — H35.3113 NONEXUDATIVE AGE-RELATED MACULAR DEGENERATION, RIGHT EYE, ADVANCED ATROPHIC WITHOUT SUBFOVEAL INVOLVEMENT: Primary | ICD-10-CM

## 2023-10-20 DIAGNOSIS — H35.3231 BILATERAL EXUDATIVE AGE-RELATED MACULAR DEGENERATION W/ ACTIVE CHOROIDAL NEOVASCULARIZATION: ICD-10-CM

## 2023-10-20 PROCEDURE — 92134 CPTRZ OPH DX IMG PST SGM RTA: CPT | Performed by: OPHTHALMOLOGY

## 2023-10-20 ASSESSMENT — INTRAOCULAR PRESSURE
OS: 10
OD: 4

## 2023-12-15 ENCOUNTER — OFFICE VISIT (OUTPATIENT)
Dept: URBAN - METROPOLITAN AREA CLINIC 76 | Facility: CLINIC | Age: 88
End: 2023-12-15
Payer: MEDICARE

## 2023-12-15 DIAGNOSIS — H35.3113 NONEXUDATIVE MACULAR DEGENERATION, ADVANCED ATROPHIC WITHOUT SUBFOVEAL INVOLVEMENT, RIGHT EYE: ICD-10-CM

## 2023-12-15 PROCEDURE — 67028 INJECTION EYE DRUG: CPT | Performed by: OPHTHALMOLOGY

## 2023-12-15 PROCEDURE — 92134 CPTRZ OPH DX IMG PST SGM RTA: CPT | Performed by: OPHTHALMOLOGY

## 2023-12-15 ASSESSMENT — INTRAOCULAR PRESSURE
OD: 5
OS: 10

## 2024-02-09 ENCOUNTER — OFFICE VISIT (OUTPATIENT)
Dept: URBAN - METROPOLITAN AREA CLINIC 76 | Facility: CLINIC | Age: 89
End: 2024-02-09
Payer: MEDICARE

## 2024-02-09 DIAGNOSIS — H35.3231 EXUDATIVE AGE-RELATED MACULAR DEGENERATION, BILATERAL, WITH ACTIVE CHOROIDAL NEOVASCULARIZATION: Primary | ICD-10-CM

## 2024-02-09 PROCEDURE — 92134 CPTRZ OPH DX IMG PST SGM RTA: CPT | Performed by: OPHTHALMOLOGY

## 2024-02-09 PROCEDURE — 67028 INJECTION EYE DRUG: CPT | Performed by: OPHTHALMOLOGY

## 2024-02-09 ASSESSMENT — INTRAOCULAR PRESSURE
OD: 4
OS: 10

## 2024-04-05 ENCOUNTER — OFFICE VISIT (OUTPATIENT)
Dept: URBAN - METROPOLITAN AREA CLINIC 76 | Facility: CLINIC | Age: 89
End: 2024-04-05
Payer: MEDICARE

## 2024-04-05 DIAGNOSIS — H35.3113 NONEXUDATIVE MACULAR DEGENERATION, ADVANCED ATROPHIC WITHOUT SUBFOVEAL INVOLVEMENT, RIGHT EYE: ICD-10-CM

## 2024-04-05 DIAGNOSIS — H35.3231 EXUDATIVE AGE-RELATED MACULAR DEGENERATION, BILATERAL, WITH ACTIVE CHOROIDAL NEOVASCULARIZATION: Primary | ICD-10-CM

## 2024-04-05 PROCEDURE — 92134 CPTRZ OPH DX IMG PST SGM RTA: CPT | Performed by: OPHTHALMOLOGY

## 2024-04-05 PROCEDURE — 67028 INJECTION EYE DRUG: CPT | Performed by: OPHTHALMOLOGY

## 2024-04-05 ASSESSMENT — INTRAOCULAR PRESSURE
OD: 6
OS: 10

## 2024-05-31 ENCOUNTER — OFFICE VISIT (OUTPATIENT)
Dept: URBAN - METROPOLITAN AREA CLINIC 76 | Facility: CLINIC | Age: 89
End: 2024-05-31
Payer: MEDICARE

## 2024-05-31 DIAGNOSIS — H35.3113 NONEXUDATIVE AGE-RELATED MACULAR DEGENERATION, RIGHT EYE, ADVANCED ATROPHIC WITHOUT SUBFOVEAL INVOLVEMENT: Primary | ICD-10-CM

## 2024-05-31 DIAGNOSIS — H35.3231 BILATERAL EXUDATIVE AGE-RELATED MACULAR DEGENERATION W/ ACTIVE CHOROIDAL NEOVASCULARIZATION: ICD-10-CM

## 2024-05-31 DIAGNOSIS — H17.822 PERIPHERAL OPACITY OF CORNEA OF LEFT EYE: ICD-10-CM

## 2024-05-31 PROCEDURE — 67028 INJECTION EYE DRUG: CPT | Performed by: OPHTHALMOLOGY

## 2024-05-31 PROCEDURE — 92134 CPTRZ OPH DX IMG PST SGM RTA: CPT | Performed by: OPHTHALMOLOGY

## 2024-05-31 ASSESSMENT — INTRAOCULAR PRESSURE
OS: 11
OD: 8

## 2024-07-26 ENCOUNTER — OFFICE VISIT (OUTPATIENT)
Dept: URBAN - METROPOLITAN AREA CLINIC 76 | Facility: CLINIC | Age: 89
End: 2024-07-26
Payer: MEDICARE

## 2024-07-26 DIAGNOSIS — H35.3231 EXUDATIVE AGE-RELATED MACULAR DEGENERATION, BILATERAL, WITH ACTIVE CHOROIDAL NEOVASCULARIZATION: Primary | ICD-10-CM

## 2024-07-26 DIAGNOSIS — H35.3113 NONEXUDATIVE MACULAR DEGENERATION, ADVANCED ATROPHIC WITHOUT SUBFOVEAL INVOLVEMENT, RIGHT EYE: ICD-10-CM

## 2024-07-26 PROCEDURE — 67028 INJECTION EYE DRUG: CPT | Performed by: OPHTHALMOLOGY

## 2024-07-26 PROCEDURE — 92134 CPTRZ OPH DX IMG PST SGM RTA: CPT | Performed by: OPHTHALMOLOGY

## 2024-07-26 ASSESSMENT — INTRAOCULAR PRESSURE
OS: 10
OD: 9